# Patient Record
Sex: MALE | Race: WHITE | NOT HISPANIC OR LATINO | URBAN - METROPOLITAN AREA
[De-identification: names, ages, dates, MRNs, and addresses within clinical notes are randomized per-mention and may not be internally consistent; named-entity substitution may affect disease eponyms.]

---

## 2017-01-03 ENCOUNTER — COMMUNICATION - HEALTHEAST (OUTPATIENT)
Dept: INTERNAL MEDICINE | Facility: CLINIC | Age: 72
End: 2017-01-03

## 2017-01-03 ENCOUNTER — OFFICE VISIT - HEALTHEAST (OUTPATIENT)
Dept: PHYSICAL THERAPY | Facility: REHABILITATION | Age: 72
End: 2017-01-03

## 2017-01-03 DIAGNOSIS — Z86.718 HISTORY OF DVT OF LOWER EXTREMITY: ICD-10-CM

## 2017-01-03 DIAGNOSIS — I87.303 VENOUS HYPERTENSION OF BOTH LOWER EXTREMITIES: ICD-10-CM

## 2017-01-03 DIAGNOSIS — M79.89 SWELLING OF LIMB: ICD-10-CM

## 2017-01-03 DIAGNOSIS — I87.009 POST-THROMBOTIC SYNDROME: ICD-10-CM

## 2017-01-03 DIAGNOSIS — M79.605 LEG PAIN, BILATERAL: ICD-10-CM

## 2017-01-03 DIAGNOSIS — N28.9 UNSPECIFIED DISORDER OF KIDNEY AND URETER: ICD-10-CM

## 2017-01-03 DIAGNOSIS — I89.0 ACQUIRED LYMPHEDEMA OF LEG: ICD-10-CM

## 2017-01-03 DIAGNOSIS — L97.922 LEG ULCER, LEFT, WITH FAT LAYER EXPOSED (H): ICD-10-CM

## 2017-01-03 DIAGNOSIS — M79.604 LEG PAIN, BILATERAL: ICD-10-CM

## 2017-01-04 ENCOUNTER — COMMUNICATION - HEALTHEAST (OUTPATIENT)
Dept: INTERNAL MEDICINE | Facility: CLINIC | Age: 72
End: 2017-01-04

## 2017-01-04 ENCOUNTER — COMMUNICATION - HEALTHEAST (OUTPATIENT)
Dept: VASCULAR SURGERY | Facility: CLINIC | Age: 72
End: 2017-01-04

## 2017-01-04 DIAGNOSIS — R52 PAIN: ICD-10-CM

## 2017-01-11 ENCOUNTER — OFFICE VISIT - HEALTHEAST (OUTPATIENT)
Dept: PHYSICAL THERAPY | Facility: REHABILITATION | Age: 72
End: 2017-01-11

## 2017-01-11 DIAGNOSIS — L97.922 LEG ULCER, LEFT, WITH FAT LAYER EXPOSED (H): ICD-10-CM

## 2017-01-11 DIAGNOSIS — M79.604 LEG PAIN, BILATERAL: ICD-10-CM

## 2017-01-11 DIAGNOSIS — M79.605 LEG PAIN, BILATERAL: ICD-10-CM

## 2017-01-11 DIAGNOSIS — Z86.718 HISTORY OF DVT OF LOWER EXTREMITY: ICD-10-CM

## 2017-01-11 DIAGNOSIS — M79.89 SWELLING OF LIMB: ICD-10-CM

## 2017-01-11 DIAGNOSIS — I87.303 VENOUS HYPERTENSION OF BOTH LOWER EXTREMITIES: ICD-10-CM

## 2017-01-11 DIAGNOSIS — I89.0 ACQUIRED LYMPHEDEMA OF LEG: ICD-10-CM

## 2017-01-11 DIAGNOSIS — I87.009 POST-THROMBOTIC SYNDROME: ICD-10-CM

## 2017-01-13 ENCOUNTER — OFFICE VISIT - HEALTHEAST (OUTPATIENT)
Dept: PHYSICAL THERAPY | Facility: REHABILITATION | Age: 72
End: 2017-01-13

## 2017-01-13 DIAGNOSIS — I89.0 ACQUIRED LYMPHEDEMA OF LEG: ICD-10-CM

## 2017-01-13 DIAGNOSIS — M79.604 LEG PAIN, BILATERAL: ICD-10-CM

## 2017-01-13 DIAGNOSIS — I87.009 POST-THROMBOTIC SYNDROME: ICD-10-CM

## 2017-01-13 DIAGNOSIS — M79.89 SWELLING OF LIMB: ICD-10-CM

## 2017-01-13 DIAGNOSIS — M79.605 LEG PAIN, BILATERAL: ICD-10-CM

## 2017-01-13 DIAGNOSIS — L97.922 LEG ULCER, LEFT, WITH FAT LAYER EXPOSED (H): ICD-10-CM

## 2017-01-16 ENCOUNTER — OFFICE VISIT - HEALTHEAST (OUTPATIENT)
Dept: PHYSICAL THERAPY | Facility: REHABILITATION | Age: 72
End: 2017-01-16

## 2017-01-16 ENCOUNTER — HOSPITAL ENCOUNTER (OUTPATIENT)
Dept: RADIOLOGY | Facility: CLINIC | Age: 72
Discharge: HOME | End: 2017-01-16
Attending: PHYSICAL MEDICINE & REHABILITATION

## 2017-01-16 DIAGNOSIS — M79.605 LEG PAIN, BILATERAL: ICD-10-CM

## 2017-01-16 DIAGNOSIS — N18.4 CHRONIC KIDNEY DISEASE (CKD) STAGE G4/A1, SEVERELY DECREASED GLOMERULAR FILTRATION RATE (GFR) BETWEEN 15-29 ML/MIN/1.73 SQUARE METER AND ALBUMINURIA CREATININE RATIO LESS THAN 30 MG/G (H): ICD-10-CM

## 2017-01-16 DIAGNOSIS — M79.89 SWELLING OF LIMB: ICD-10-CM

## 2017-01-16 DIAGNOSIS — I87.303 VENOUS HYPERTENSION OF BOTH LOWER EXTREMITIES: ICD-10-CM

## 2017-01-16 DIAGNOSIS — I87.009 POST-THROMBOTIC SYNDROME: ICD-10-CM

## 2017-01-16 DIAGNOSIS — L97.922 LEG ULCER, LEFT, WITH FAT LAYER EXPOSED (H): ICD-10-CM

## 2017-01-16 DIAGNOSIS — M79.604 LEG PAIN, BILATERAL: ICD-10-CM

## 2017-01-16 DIAGNOSIS — M17.0 PRIMARY OSTEOARTHRITIS OF BOTH KNEES: ICD-10-CM

## 2017-01-16 DIAGNOSIS — I89.0 ACQUIRED LYMPHEDEMA OF LEG: ICD-10-CM

## 2017-01-16 DIAGNOSIS — Z72.0 TOBACCO ABUSE: ICD-10-CM

## 2017-01-16 DIAGNOSIS — M10.9 GOUT, UNSPECIFIED: ICD-10-CM

## 2017-01-16 DIAGNOSIS — Z86.718 HISTORY OF DVT OF LOWER EXTREMITY: ICD-10-CM

## 2017-01-17 ENCOUNTER — COMMUNICATION - HEALTHEAST (OUTPATIENT)
Dept: VASCULAR SURGERY | Facility: CLINIC | Age: 72
End: 2017-01-17

## 2017-01-18 ENCOUNTER — OFFICE VISIT - HEALTHEAST (OUTPATIENT)
Dept: VASCULAR SURGERY | Facility: CLINIC | Age: 72
End: 2017-01-18

## 2017-01-18 DIAGNOSIS — M79.605 LEG PAIN, BILATERAL: ICD-10-CM

## 2017-01-18 DIAGNOSIS — I87.303 VENOUS HYPERTENSION OF BOTH LOWER EXTREMITIES: ICD-10-CM

## 2017-01-18 DIAGNOSIS — Z86.718 HISTORY OF DVT OF LOWER EXTREMITY: ICD-10-CM

## 2017-01-18 DIAGNOSIS — L97.922 LEG ULCER, LEFT, WITH FAT LAYER EXPOSED (H): ICD-10-CM

## 2017-01-18 DIAGNOSIS — I89.0 ACQUIRED LYMPHEDEMA OF LEG: ICD-10-CM

## 2017-01-18 DIAGNOSIS — I87.009 POST-THROMBOTIC SYNDROME: ICD-10-CM

## 2017-01-18 DIAGNOSIS — M79.604 LEG PAIN, BILATERAL: ICD-10-CM

## 2017-01-18 DIAGNOSIS — L60.3 NAIL DYSTROPHY: ICD-10-CM

## 2017-01-18 DIAGNOSIS — M10.9 GOUT, UNSPECIFIED: ICD-10-CM

## 2017-01-18 DIAGNOSIS — Z72.0 TOBACCO ABUSE: ICD-10-CM

## 2017-01-18 DIAGNOSIS — N18.4 CHRONIC KIDNEY DISEASE (CKD) STAGE G4/A1, SEVERELY DECREASED GLOMERULAR FILTRATION RATE (GFR) BETWEEN 15-29 ML/MIN/1.73 SQUARE METER AND ALBUMINURIA CREATININE RATIO LESS THAN 30 MG/G (H): ICD-10-CM

## 2017-01-18 ASSESSMENT — MIFFLIN-ST. JEOR: SCORE: 1894.31

## 2017-01-19 ENCOUNTER — OFFICE VISIT - HEALTHEAST (OUTPATIENT)
Dept: PHYSICAL THERAPY | Facility: REHABILITATION | Age: 72
End: 2017-01-19

## 2017-01-19 DIAGNOSIS — I87.009 POST-THROMBOTIC SYNDROME: ICD-10-CM

## 2017-01-19 DIAGNOSIS — M79.89 SWELLING OF LIMB: ICD-10-CM

## 2017-01-19 DIAGNOSIS — M79.604 LEG PAIN, BILATERAL: ICD-10-CM

## 2017-01-19 DIAGNOSIS — Z86.718 HISTORY OF DVT OF LOWER EXTREMITY: ICD-10-CM

## 2017-01-19 DIAGNOSIS — I89.0 ACQUIRED LYMPHEDEMA OF LEG: ICD-10-CM

## 2017-01-19 DIAGNOSIS — M79.605 LEG PAIN, BILATERAL: ICD-10-CM

## 2017-01-19 DIAGNOSIS — L97.922 LEG ULCER, LEFT, WITH FAT LAYER EXPOSED (H): ICD-10-CM

## 2017-01-19 DIAGNOSIS — I87.303 VENOUS HYPERTENSION OF BOTH LOWER EXTREMITIES: ICD-10-CM

## 2017-01-23 ENCOUNTER — OFFICE VISIT - HEALTHEAST (OUTPATIENT)
Dept: PHYSICAL THERAPY | Facility: REHABILITATION | Age: 72
End: 2017-01-23

## 2017-01-23 ENCOUNTER — COMMUNICATION - HEALTHEAST (OUTPATIENT)
Dept: SURGERY | Facility: CLINIC | Age: 72
End: 2017-01-23

## 2017-01-23 DIAGNOSIS — M79.89 SWELLING OF LIMB: ICD-10-CM

## 2017-01-23 DIAGNOSIS — I87.009 POST-THROMBOTIC SYNDROME: ICD-10-CM

## 2017-01-23 DIAGNOSIS — Z86.718 HISTORY OF DVT OF LOWER EXTREMITY: ICD-10-CM

## 2017-01-23 DIAGNOSIS — M79.604 LEG PAIN, BILATERAL: ICD-10-CM

## 2017-01-23 DIAGNOSIS — L97.922 LEG ULCER, LEFT, WITH FAT LAYER EXPOSED (H): ICD-10-CM

## 2017-01-23 DIAGNOSIS — M79.605 LEG PAIN, BILATERAL: ICD-10-CM

## 2017-01-23 DIAGNOSIS — I89.0 ACQUIRED LYMPHEDEMA OF LEG: ICD-10-CM

## 2017-01-23 DIAGNOSIS — I87.303 VENOUS HYPERTENSION OF BOTH LOWER EXTREMITIES: ICD-10-CM

## 2017-01-25 ENCOUNTER — OFFICE VISIT - HEALTHEAST (OUTPATIENT)
Dept: PHYSICAL THERAPY | Facility: REHABILITATION | Age: 72
End: 2017-01-25

## 2017-01-25 ENCOUNTER — COMMUNICATION - HEALTHEAST (OUTPATIENT)
Dept: VASCULAR SURGERY | Facility: CLINIC | Age: 72
End: 2017-01-25

## 2017-01-25 DIAGNOSIS — M79.89 SWELLING OF LIMB: ICD-10-CM

## 2017-01-25 DIAGNOSIS — I87.009 POST-THROMBOTIC SYNDROME: ICD-10-CM

## 2017-01-25 DIAGNOSIS — I87.303 VENOUS HYPERTENSION OF BOTH LOWER EXTREMITIES: ICD-10-CM

## 2017-01-25 DIAGNOSIS — Z86.718 HISTORY OF DVT OF LOWER EXTREMITY: ICD-10-CM

## 2017-01-25 DIAGNOSIS — L97.922 LEG ULCER, LEFT, WITH FAT LAYER EXPOSED (H): ICD-10-CM

## 2017-01-25 DIAGNOSIS — I89.0 ACQUIRED LYMPHEDEMA OF LEG: ICD-10-CM

## 2017-01-25 DIAGNOSIS — M79.604 LEG PAIN, BILATERAL: ICD-10-CM

## 2017-01-25 DIAGNOSIS — M79.605 LEG PAIN, BILATERAL: ICD-10-CM

## 2017-01-30 ENCOUNTER — OFFICE VISIT - HEALTHEAST (OUTPATIENT)
Dept: PHYSICAL THERAPY | Facility: REHABILITATION | Age: 72
End: 2017-01-30

## 2017-01-30 ENCOUNTER — COMMUNICATION - HEALTHEAST (OUTPATIENT)
Dept: INTERNAL MEDICINE | Facility: CLINIC | Age: 72
End: 2017-01-30

## 2017-01-30 DIAGNOSIS — I87.009 POST-THROMBOTIC SYNDROME: ICD-10-CM

## 2017-01-30 DIAGNOSIS — M79.89 SWELLING OF LIMB: ICD-10-CM

## 2017-01-30 DIAGNOSIS — L97.922 LEG ULCER, LEFT, WITH FAT LAYER EXPOSED (H): ICD-10-CM

## 2017-01-30 DIAGNOSIS — I89.0 ACQUIRED LYMPHEDEMA OF LEG: ICD-10-CM

## 2017-01-30 DIAGNOSIS — I87.303 VENOUS HYPERTENSION OF BOTH LOWER EXTREMITIES: ICD-10-CM

## 2017-01-30 DIAGNOSIS — Z86.718 HISTORY OF DVT OF LOWER EXTREMITY: ICD-10-CM

## 2017-01-30 DIAGNOSIS — M79.604 LEG PAIN, BILATERAL: ICD-10-CM

## 2017-01-30 DIAGNOSIS — M79.605 LEG PAIN, BILATERAL: ICD-10-CM

## 2017-02-15 ENCOUNTER — COMMUNICATION - HEALTHEAST (OUTPATIENT)
Dept: INTERNAL MEDICINE | Facility: CLINIC | Age: 72
End: 2017-02-15

## 2017-02-15 ENCOUNTER — OFFICE VISIT - HEALTHEAST (OUTPATIENT)
Dept: VASCULAR SURGERY | Facility: CLINIC | Age: 72
End: 2017-02-15

## 2017-02-15 DIAGNOSIS — I89.0 ACQUIRED LYMPHEDEMA OF LEG: ICD-10-CM

## 2017-02-15 DIAGNOSIS — E55.9 VITAMIN D DEFICIENCY: ICD-10-CM

## 2017-02-15 DIAGNOSIS — I87.303 VENOUS HYPERTENSION OF BOTH LOWER EXTREMITIES: ICD-10-CM

## 2017-02-15 DIAGNOSIS — I87.009 POST-THROMBOTIC SYNDROME: ICD-10-CM

## 2017-02-15 DIAGNOSIS — M79.89 SWELLING OF LIMB: ICD-10-CM

## 2017-02-15 DIAGNOSIS — L97.922 LEG ULCER, LEFT, WITH FAT LAYER EXPOSED (H): ICD-10-CM

## 2017-02-15 DIAGNOSIS — R52 PAIN: ICD-10-CM

## 2017-02-15 DIAGNOSIS — L29.9 PRURITUS: ICD-10-CM

## 2017-02-16 ENCOUNTER — COMMUNICATION - HEALTHEAST (OUTPATIENT)
Dept: INTERNAL MEDICINE | Facility: CLINIC | Age: 72
End: 2017-02-16

## 2017-02-16 DIAGNOSIS — R52 PAIN: ICD-10-CM

## 2017-03-15 ENCOUNTER — COMMUNICATION - HEALTHEAST (OUTPATIENT)
Dept: INTERNAL MEDICINE | Facility: CLINIC | Age: 72
End: 2017-03-15

## 2017-03-15 DIAGNOSIS — R52 PAIN: ICD-10-CM

## 2017-03-16 ENCOUNTER — AMBULATORY - HEALTHEAST (OUTPATIENT)
Dept: INTERNAL MEDICINE | Facility: CLINIC | Age: 72
End: 2017-03-16

## 2017-03-16 ENCOUNTER — COMMUNICATION - HEALTHEAST (OUTPATIENT)
Dept: INTERNAL MEDICINE | Facility: CLINIC | Age: 72
End: 2017-03-16

## 2017-03-16 DIAGNOSIS — Z86.718 HISTORY OF DVT OF LOWER EXTREMITY: ICD-10-CM

## 2017-03-16 DIAGNOSIS — I82.409 DVT (DEEP VENOUS THROMBOSIS) (H): ICD-10-CM

## 2017-03-20 ENCOUNTER — COMMUNICATION - HEALTHEAST (OUTPATIENT)
Dept: INTERNAL MEDICINE | Facility: CLINIC | Age: 72
End: 2017-03-20

## 2017-03-20 DIAGNOSIS — G25.81 RLS (RESTLESS LEGS SYNDROME): ICD-10-CM

## 2017-03-24 ENCOUNTER — COMMUNICATION - HEALTHEAST (OUTPATIENT)
Dept: INTERNAL MEDICINE | Facility: CLINIC | Age: 72
End: 2017-03-24

## 2017-03-24 ENCOUNTER — AMBULATORY - HEALTHEAST (OUTPATIENT)
Dept: LAB | Facility: CLINIC | Age: 72
End: 2017-03-24

## 2017-03-24 DIAGNOSIS — I82.409 DVT (DEEP VENOUS THROMBOSIS) (H): ICD-10-CM

## 2017-03-24 DIAGNOSIS — Z86.718 HISTORY OF DVT OF LOWER EXTREMITY: ICD-10-CM

## 2017-04-06 ENCOUNTER — COMMUNICATION - HEALTHEAST (OUTPATIENT)
Dept: INTERNAL MEDICINE | Facility: CLINIC | Age: 72
End: 2017-04-06

## 2017-04-06 DIAGNOSIS — R52 PAIN: ICD-10-CM

## 2017-04-17 ENCOUNTER — COMMUNICATION - HEALTHEAST (OUTPATIENT)
Dept: SURGERY | Facility: CLINIC | Age: 72
End: 2017-04-17

## 2017-04-17 ENCOUNTER — OFFICE VISIT - HEALTHEAST (OUTPATIENT)
Dept: VASCULAR SURGERY | Facility: CLINIC | Age: 72
End: 2017-04-17

## 2017-04-17 DIAGNOSIS — L03.116 CELLULITIS OF LEFT LEG WITHOUT FOOT: ICD-10-CM

## 2017-04-17 DIAGNOSIS — I89.0 ACQUIRED LYMPHEDEMA OF LEG: ICD-10-CM

## 2017-04-17 DIAGNOSIS — I87.009 POST-THROMBOTIC SYNDROME: ICD-10-CM

## 2017-04-17 DIAGNOSIS — N18.4 CHRONIC KIDNEY DISEASE (CKD) STAGE G4/A1, SEVERELY DECREASED GLOMERULAR FILTRATION RATE (GFR) BETWEEN 15-29 ML/MIN/1.73 SQUARE METER AND ALBUMINURIA CREATININE RATIO LESS THAN 30 MG/G (H): ICD-10-CM

## 2017-04-17 DIAGNOSIS — L97.922 LEG ULCER, LEFT, WITH FAT LAYER EXPOSED (H): ICD-10-CM

## 2017-04-17 DIAGNOSIS — I87.303 VENOUS HYPERTENSION OF BOTH LOWER EXTREMITIES: ICD-10-CM

## 2017-04-17 DIAGNOSIS — M79.89 SWELLING OF LIMB: ICD-10-CM

## 2017-04-19 ENCOUNTER — AMBULATORY - HEALTHEAST (OUTPATIENT)
Dept: VASCULAR SURGERY | Facility: CLINIC | Age: 72
End: 2017-04-19

## 2017-04-19 ENCOUNTER — COMMUNICATION - HEALTHEAST (OUTPATIENT)
Dept: VASCULAR SURGERY | Facility: CLINIC | Age: 72
End: 2017-04-19

## 2017-04-19 ENCOUNTER — COMMUNICATION - HEALTHEAST (OUTPATIENT)
Dept: INTERNAL MEDICINE | Facility: CLINIC | Age: 72
End: 2017-04-19

## 2017-04-19 DIAGNOSIS — Z86.718 HISTORY OF DVT OF LOWER EXTREMITY: ICD-10-CM

## 2017-04-19 DIAGNOSIS — L03.116 CELLULITIS OF LEFT LEG WITHOUT FOOT: ICD-10-CM

## 2017-05-01 ENCOUNTER — COMMUNICATION - HEALTHEAST (OUTPATIENT)
Dept: INTERNAL MEDICINE | Facility: CLINIC | Age: 72
End: 2017-05-01

## 2017-05-01 ENCOUNTER — OFFICE VISIT - HEALTHEAST (OUTPATIENT)
Dept: INTERNAL MEDICINE | Facility: CLINIC | Age: 72
End: 2017-05-01

## 2017-05-01 DIAGNOSIS — I89.0 ACQUIRED LYMPHEDEMA OF LEG: ICD-10-CM

## 2017-05-01 DIAGNOSIS — R52 PAIN: ICD-10-CM

## 2017-05-01 DIAGNOSIS — L97.922 LEG ULCER, LEFT, WITH FAT LAYER EXPOSED (H): ICD-10-CM

## 2017-05-01 DIAGNOSIS — N18.4 CHRONIC KIDNEY DISEASE (CKD) STAGE G4/A1, SEVERELY DECREASED GLOMERULAR FILTRATION RATE (GFR) BETWEEN 15-29 ML/MIN/1.73 SQUARE METER AND ALBUMINURIA CREATININE RATIO LESS THAN 30 MG/G (H): ICD-10-CM

## 2017-05-02 ENCOUNTER — COMMUNICATION - HEALTHEAST (OUTPATIENT)
Dept: INTERNAL MEDICINE | Facility: CLINIC | Age: 72
End: 2017-05-02

## 2017-05-03 ENCOUNTER — COMMUNICATION - HEALTHEAST (OUTPATIENT)
Dept: INTERNAL MEDICINE | Facility: CLINIC | Age: 72
End: 2017-05-03

## 2017-05-03 DIAGNOSIS — N28.9 UNSPECIFIED DISORDER OF KIDNEY AND URETER: ICD-10-CM

## 2017-05-04 ENCOUNTER — COMMUNICATION - HEALTHEAST (OUTPATIENT)
Dept: INTERNAL MEDICINE | Facility: CLINIC | Age: 72
End: 2017-05-04

## 2017-05-04 DIAGNOSIS — R52 PAIN: ICD-10-CM

## 2017-05-09 ENCOUNTER — OFFICE VISIT - HEALTHEAST (OUTPATIENT)
Dept: INTERNAL MEDICINE | Facility: CLINIC | Age: 72
End: 2017-05-09

## 2017-05-09 DIAGNOSIS — I89.0 ACQUIRED LYMPHEDEMA OF LEG: ICD-10-CM

## 2017-05-09 DIAGNOSIS — I10 ESSENTIAL HYPERTENSION: ICD-10-CM

## 2017-05-09 DIAGNOSIS — Z12.11 SCREENING FOR COLON CANCER: ICD-10-CM

## 2017-05-09 DIAGNOSIS — R52 PAIN: ICD-10-CM

## 2017-05-15 ENCOUNTER — COMMUNICATION - HEALTHEAST (OUTPATIENT)
Dept: INTERNAL MEDICINE | Facility: CLINIC | Age: 72
End: 2017-05-15

## 2017-05-15 DIAGNOSIS — Z86.718 HISTORY OF DVT OF LOWER EXTREMITY: ICD-10-CM

## 2017-05-29 ENCOUNTER — COMMUNICATION - HEALTHEAST (OUTPATIENT)
Dept: INTERNAL MEDICINE | Facility: CLINIC | Age: 72
End: 2017-05-29

## 2017-05-29 DIAGNOSIS — G25.81 RLS (RESTLESS LEGS SYNDROME): ICD-10-CM

## 2017-06-05 ENCOUNTER — OFFICE VISIT - HEALTHEAST (OUTPATIENT)
Dept: VASCULAR SURGERY | Facility: CLINIC | Age: 72
End: 2017-06-05

## 2017-06-05 ENCOUNTER — COMMUNICATION - HEALTHEAST (OUTPATIENT)
Dept: INTERNAL MEDICINE | Facility: CLINIC | Age: 72
End: 2017-06-05

## 2017-06-05 DIAGNOSIS — I87.009 POST-THROMBOTIC SYNDROME: ICD-10-CM

## 2017-06-05 DIAGNOSIS — I87.303 VENOUS HYPERTENSION OF BOTH LOWER EXTREMITIES: ICD-10-CM

## 2017-06-05 DIAGNOSIS — Z72.0 TOBACCO ABUSE: ICD-10-CM

## 2017-06-05 DIAGNOSIS — Z86.718 HISTORY OF DVT OF LOWER EXTREMITY: ICD-10-CM

## 2017-06-05 DIAGNOSIS — R52 PAIN: ICD-10-CM

## 2017-06-05 DIAGNOSIS — I89.0 ACQUIRED LYMPHEDEMA OF LEG: ICD-10-CM

## 2017-06-06 ENCOUNTER — COMMUNICATION - HEALTHEAST (OUTPATIENT)
Dept: INTERNAL MEDICINE | Facility: CLINIC | Age: 72
End: 2017-06-06

## 2017-06-13 ENCOUNTER — COMMUNICATION - HEALTHEAST (OUTPATIENT)
Dept: INTERNAL MEDICINE | Facility: CLINIC | Age: 72
End: 2017-06-13

## 2017-06-13 DIAGNOSIS — Z86.718 HISTORY OF DVT OF LOWER EXTREMITY: ICD-10-CM

## 2017-06-14 ENCOUNTER — COMMUNICATION - HEALTHEAST (OUTPATIENT)
Dept: INTERNAL MEDICINE | Facility: CLINIC | Age: 72
End: 2017-06-14

## 2017-06-14 DIAGNOSIS — Z86.718 HISTORY OF DVT OF LOWER EXTREMITY: ICD-10-CM

## 2017-06-22 ENCOUNTER — COMMUNICATION - HEALTHEAST (OUTPATIENT)
Dept: INTERNAL MEDICINE | Facility: CLINIC | Age: 72
End: 2017-06-22

## 2017-06-22 DIAGNOSIS — I10 ESSENTIAL HYPERTENSION: ICD-10-CM

## 2017-06-25 ENCOUNTER — COMMUNICATION - HEALTHEAST (OUTPATIENT)
Dept: INTERNAL MEDICINE | Facility: CLINIC | Age: 72
End: 2017-06-25

## 2017-06-25 DIAGNOSIS — I10 ESSENTIAL HYPERTENSION: ICD-10-CM

## 2017-06-26 ENCOUNTER — COMMUNICATION - HEALTHEAST (OUTPATIENT)
Dept: INTERNAL MEDICINE | Facility: CLINIC | Age: 72
End: 2017-06-26

## 2017-06-26 DIAGNOSIS — R52 PAIN: ICD-10-CM

## 2017-07-26 ENCOUNTER — COMMUNICATION - HEALTHEAST (OUTPATIENT)
Dept: INTERNAL MEDICINE | Facility: CLINIC | Age: 72
End: 2017-07-26

## 2017-07-26 DIAGNOSIS — R52 PAIN: ICD-10-CM

## 2017-08-09 ENCOUNTER — COMMUNICATION - HEALTHEAST (OUTPATIENT)
Dept: INTERNAL MEDICINE | Facility: CLINIC | Age: 72
End: 2017-08-09

## 2017-08-18 ENCOUNTER — COMMUNICATION - HEALTHEAST (OUTPATIENT)
Dept: INTERNAL MEDICINE | Facility: CLINIC | Age: 72
End: 2017-08-18

## 2017-08-18 DIAGNOSIS — I10 ESSENTIAL HYPERTENSION: ICD-10-CM

## 2017-08-28 ENCOUNTER — COMMUNICATION - HEALTHEAST (OUTPATIENT)
Dept: INTERNAL MEDICINE | Facility: CLINIC | Age: 72
End: 2017-08-28

## 2017-08-28 DIAGNOSIS — G25.81 RLS (RESTLESS LEGS SYNDROME): ICD-10-CM

## 2017-08-28 DIAGNOSIS — N28.9 UNSPECIFIED DISORDER OF KIDNEY AND URETER: ICD-10-CM

## 2017-08-28 DIAGNOSIS — Z86.718 HISTORY OF DVT OF LOWER EXTREMITY: ICD-10-CM

## 2017-08-28 DIAGNOSIS — G89.29 CHRONIC PAIN: ICD-10-CM

## 2017-08-28 DIAGNOSIS — I10 ESSENTIAL HYPERTENSION: ICD-10-CM

## 2017-08-29 RX ORDER — OXYCODONE AND ACETAMINOPHEN 5; 325 MG/1; MG/1
2 TABLET ORAL EVERY 6 HOURS PRN
Qty: 240 TABLET | Refills: 0 | Status: SHIPPED | OUTPATIENT
Start: 2017-08-29

## 2017-08-29 RX ORDER — GABAPENTIN 300 MG/1
CAPSULE ORAL
Qty: 270 CAPSULE | Refills: 0 | Status: SHIPPED | OUTPATIENT
Start: 2017-08-29

## 2017-08-29 RX ORDER — WARFARIN SODIUM 7.5 MG/1
TABLET ORAL
Qty: 30 TABLET | Refills: 0 | Status: SHIPPED | OUTPATIENT
Start: 2017-08-29

## 2017-08-29 RX ORDER — ATENOLOL 50 MG/1
TABLET ORAL
Qty: 135 TABLET | Refills: 0 | Status: SHIPPED | OUTPATIENT
Start: 2017-08-29

## 2017-08-29 RX ORDER — AMLODIPINE BESYLATE 10 MG/1
10 TABLET ORAL DAILY
Qty: 90 TABLET | Refills: 0 | Status: SHIPPED | OUTPATIENT
Start: 2017-08-29

## 2017-08-30 ENCOUNTER — COMMUNICATION - HEALTHEAST (OUTPATIENT)
Dept: INTERNAL MEDICINE | Facility: CLINIC | Age: 72
End: 2017-08-30

## 2017-09-12 ENCOUNTER — COMMUNICATION - HEALTHEAST (OUTPATIENT)
Dept: INTERNAL MEDICINE | Facility: CLINIC | Age: 72
End: 2017-09-12

## 2017-09-12 DIAGNOSIS — I10 ESSENTIAL HYPERTENSION: ICD-10-CM

## 2017-09-12 RX ORDER — METOPROLOL SUCCINATE 100 MG/1
100 TABLET, EXTENDED RELEASE ORAL DAILY
Qty: 90 TABLET | Refills: 1 | Status: SHIPPED | OUTPATIENT
Start: 2017-09-12

## 2017-11-13 ENCOUNTER — COMMUNICATION - HEALTHEAST (OUTPATIENT)
Dept: INTERNAL MEDICINE | Facility: CLINIC | Age: 72
End: 2017-11-13

## 2017-11-13 DIAGNOSIS — G25.81 RLS (RESTLESS LEGS SYNDROME): ICD-10-CM

## 2018-03-31 ENCOUNTER — COMMUNICATION - HEALTHEAST (OUTPATIENT)
Dept: INTERNAL MEDICINE | Facility: CLINIC | Age: 73
End: 2018-03-31

## 2018-03-31 DIAGNOSIS — N28.9 DISORDER OF KIDNEY AND URETER: ICD-10-CM

## 2018-03-31 RX ORDER — FUROSEMIDE 20 MG
TABLET ORAL
Qty: 90 TABLET | Refills: 0 | Status: SHIPPED | OUTPATIENT
Start: 2018-03-31

## 2018-07-29 ENCOUNTER — COMMUNICATION - HEALTHEAST (OUTPATIENT)
Dept: INTERNAL MEDICINE | Facility: CLINIC | Age: 73
End: 2018-07-29

## 2018-07-29 DIAGNOSIS — G25.81 RLS (RESTLESS LEGS SYNDROME): ICD-10-CM

## 2018-07-30 RX ORDER — GABAPENTIN 300 MG/1
CAPSULE ORAL
Qty: 90 CAPSULE | Refills: 0 | Status: SHIPPED | OUTPATIENT
Start: 2018-07-30

## 2018-09-15 ENCOUNTER — COMMUNICATION - HEALTHEAST (OUTPATIENT)
Dept: INTERNAL MEDICINE | Facility: CLINIC | Age: 73
End: 2018-09-15

## 2018-09-15 DIAGNOSIS — I10 ESSENTIAL HYPERTENSION: ICD-10-CM

## 2018-09-19 RX ORDER — ATENOLOL 50 MG/1
TABLET ORAL
Qty: 135 TABLET | Refills: 0 | Status: SHIPPED | OUTPATIENT
Start: 2018-09-19

## 2019-07-03 ENCOUNTER — COMMUNICATION - HEALTHEAST (OUTPATIENT)
Dept: INTERNAL MEDICINE | Facility: CLINIC | Age: 74
End: 2019-07-03

## 2019-07-03 DIAGNOSIS — I10 ESSENTIAL HYPERTENSION: ICD-10-CM

## 2021-05-25 ENCOUNTER — RECORDS - HEALTHEAST (OUTPATIENT)
Dept: ADMINISTRATIVE | Facility: CLINIC | Age: 76
End: 2021-05-25

## 2021-05-28 ENCOUNTER — RECORDS - HEALTHEAST (OUTPATIENT)
Dept: ADMINISTRATIVE | Facility: CLINIC | Age: 76
End: 2021-05-28

## 2021-05-29 ENCOUNTER — RECORDS - HEALTHEAST (OUTPATIENT)
Dept: ADMINISTRATIVE | Facility: CLINIC | Age: 76
End: 2021-05-29

## 2021-05-30 VITALS — WEIGHT: 245 LBS | HEIGHT: 72 IN | BODY MASS INDEX: 33.18 KG/M2

## 2021-05-30 NOTE — TELEPHONE ENCOUNTER
Patient has establish care someone else. Instructed patient to call pharmacy and let them know they are sending prescription to wrong clinic.

## 2021-05-30 NOTE — TELEPHONE ENCOUNTER
RN cannot approve Refill Request    Former patient of Dr Janessa Barraza & has not established care with another provider.  Please assign refill request to covering provider per Clinic standard process.     Zara Fernando, [facility] Triage/Med Refill 7/4/2019    Requested Prescriptions   Pending Prescriptions Disp Refills     atenolol (TENORMIN) 50 MG tablet [Pharmacy Med Name: ATENOLOL 50MG TABLETS] 135 tablet 0     Sig: TAKE 1&1/2 TABLETS BY MOUTH DAILY       Beta-Blockers Refill Protocol Failed - 7/3/2019  2:01 PM        Failed - PCP or prescribing provider visit in past 12 months or next 3 months     Last office visit with prescriber/PCP: 5/9/2017 Janessa Barraza MD OR same dept: Visit date not found OR same specialty: 5/9/2017 Janessa Barraza MD  Last physical: Visit date not found Last MTM visit: Visit date not found   Next visit within 3 mo: Visit date not found  Next physical within 3 mo: Visit date not found  Prescriber OR PCP: Janessa Barraza MD  Last diagnosis associated with med order: 1. Essential hypertension  - atenolol (TENORMIN) 50 MG tablet [Pharmacy Med Name: ATENOLOL 50MG TABLETS]; TAKE 1&1/2 TABLETS BY MOUTH DAILY  Dispense: 135 tablet; Refill: 0    If protocol passes may refill for 12 months if within 3 months of last provider visit (or a total of 15 months).             Failed - Blood pressure filed in past 12 months     BP Readings from Last 1 Encounters:   06/05/17 104/44

## 2021-06-05 ENCOUNTER — RECORDS - HEALTHEAST (OUTPATIENT)
Dept: SCHEDULING | Facility: CLINIC | Age: 76
End: 2021-06-05

## 2021-06-05 ENCOUNTER — RECORDS - HEALTHEAST (OUTPATIENT)
Dept: CARDIOLOGY | Facility: CLINIC | Age: 76
End: 2021-06-05

## 2021-06-05 DIAGNOSIS — N18.9 ANEMIA IN CHRONIC KIDNEY DISEASE: ICD-10-CM

## 2021-06-05 DIAGNOSIS — D63.1 ANEMIA IN CHRONIC KIDNEY DISEASE: ICD-10-CM

## 2021-06-05 DIAGNOSIS — R94.31 ABNORMAL ELECTROCARDIOGRAM: ICD-10-CM

## 2021-06-05 DIAGNOSIS — I10 ESSENTIAL HYPERTENSION: ICD-10-CM

## 2021-06-05 DIAGNOSIS — M10.9 GOUT: ICD-10-CM

## 2021-06-05 DIAGNOSIS — N18.4 CHRONIC KIDNEY DISEASE, STAGE IV (SEVERE) (H): ICD-10-CM

## 2021-06-08 NOTE — PROGRESS NOTES
[facility] Daily Progress     Patient Name: Isaias Sales  Date: 1/3/2017  Visit #: 19  PTA# 6  Referral Diagnosis: phlebolymphedema, fibrosis, wound  Referring provider: Sierra Raza, *  Visit Diagnosis:     ICD-10-CM    1. 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 Swelling of limb M79.89    3. Venous hypertension of both lower extremities I87.303    4. Acquired lymphedema of leg I89.0    5. Leg ulcer, left, with fat layer exposed L97.922    6. Leg pain, bilateral M79.604     M79.605    7. History of DVT of lower extremity Z86.718      Assessment:   Spoke with Karen from  vascular center while pt was in the clinic. Karen stated that she had ordered the wound dressing on 12/28 and requested that it be sent over night. Pt has not received this yet.  Pt has been instructed to call the vascular center if the wound dressing is not received today. Pt reports he checks his temperature daily.    Pt has not scheduled garment fit yet.  Dorsum of feet remain swollen.  Wound on L anterior lower leg remains unchanged from last session. Skin around wound is red.       Goal Status:  Pt. will be independent with home exercise program in : 6 weeks: Ongoing  Patient will have a decreased volume in : bilateral;LE;for better fit of clothing;to decrease risk of infection;in 6 weeks: Ongoing  Patient will perform, verbalize self-management of: skin care;self-monitoring;compression wear;compression care;in 6 weeks: Ongoong    Plan / Patient Education:     Continue with initial plan of care.    Wrapping then MLD.     Do wound care next session, patient has new silver dressing, to be changed weekly. (Patient to hear back from [facility] about how to get more product). Did not have it today.    Pt to see Dr. Raza on 1/18/17.  Consider re measuring LE volumes next visit.    Subjective:     Pt has not heard from the  Vascular center regarding the wound care medication. He has been waiting for a call back.   Pt reports  that the wound does not cause him pain.   Pt took the bandages off yesterday morning due to his legs itching.    Objective:   Caregiver present: No    Observation of swelling: Improving, dorsum of feet remain puffy R>L    Volume measurements taken:  No (Last Taken 12-7-16)    Skin condition is:  Intact on The R, open wound on the L, appears to be the same as last visit.     Compression: Tubes on B LE with wound dressing on L lower leg..     Treatment Today   12/9/2016  TREATMENT MINUTES COMMENTS   Evaluation     Self-care/ Home management 40 Washed B LE with warm water, towels.  Wound care with Santyl cream and oil emulsion dressing due to pt not having silver with today,  Medical compression bandages B LE: stockinette, 3 rolls of comprilan with tube on top. Placed gray foam over top of bilat dorsum of feet              Manual therapy 15 MLD/LE pump points B   Neuromuscular Re-education     Therapeutic Activity     Therapeutic Exercises     Gait training     Modality__________________                   Total 55    Blank areas are intentional and mean the treatment did not include these items.       Shannan Luna,CLT  1/3/2017

## 2021-06-08 NOTE — PROGRESS NOTES
[facility] Daily Progress     Patient Name: Isaias Sales  Date: 1/23/2017  Visit #: 24  Referral Diagnosis: lymphedema, fibrosis, wound  Referring provider: Sierra Raza, *  Visit Diagnosis:     ICD-10-CM    1. 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 Swelling of limb M79.89    3. Acquired lymphedema of leg I89.0    4. Leg pain, bilateral M79.604     M79.605    5. Leg ulcer, left, with fat layer exposed L97.922    6. Venous hypertension of both lower extremities I87.303    7. History of DVT of lower extremity Z86.718          Assessment:     Patient has one more visit with PT until he sees [facility] this week. Will schedule more if needed until he gets his garments, because he will get too much refill in LEs if he has to wait >1-2 days for garment.     Did new wound care management today as patient did bring in supplies today.     Goal Status:  Pt. will be independent with home exercise program: PROGRESSING TOWARD  Patient will have a decreased volume in : bilateral;LE;for better fit of clothing;to decrease risk of infection: PROGRESSING TOWARD  Patient will perform, verbalize self-management of: skin care;self-monitoring;compression wear;compression care: PROGRESSING TOWARD    Plan / Patient Education:     COntinue with POC until velcro garments arrive.    Subjective:     No new changes since last visit.   Took compression wraps off this morning to change clothing.     Objective:     Patient to dept with elasticized stockinette only and noting bunching/rolling and binding below knees and at ankles resulting in pooling of edema of dorsal feet and lower leg.  Even though he hasn't had compression wraps off for long this morning, he does have some refill of swelling.    No weeping in LEs.    Treatment Today   1/13/2017  TREATMENT MINUTES COMMENTS   Evaluation     Self-care/ Home management 28 Continued to Educate pt in proper use of elasticized stockinette if used to minimize constriction and  pooling below and above as well as need for continuous compression bandaging to minimize edema refill to promote wound healing and optimize home management.  Performed wound care removing prior bandaging. Used silver mesh piece to fit inside th wound, with sticky pad over top and yo to secure it. Lotioned LEs prior to compression wraps. Continued to instruct in gradient compression bandaging using tricofix stockinette followed by 8cm spiraled 3x around foot then up to ankle then center of heel, then spiraled lower 1/2 of heel bandage and back to ankle.  10cm x 5m figure 8 malleoli to widest calf, educated in likely benefit of adding 10cm x 10m figure 8 malleoli to below knee and ending with 12cm x 5m spiraled malleoli to knee and educated patient to remove and reapply over weekend if noting loosening of bandage.     Manual therapy     Neuromuscular Re-education     Therapeutic Activity     Therapeutic Exercises     Gait training     Modality__________________                Total 28    Blank areas are intentional and mean the treatment did not include these items.       Kimmie Azul, DPT, CLT  1/23/2017

## 2021-06-08 NOTE — PROGRESS NOTES
[facility] Daily Progress     Patient Name: Isaias Sales  Date: 1/25/2017  Visit #: 25  Referral Diagnosis: lymphedema, fibrosis, wound  Referring provider: Sierra Raza, *  Visit Diagnosis:     ICD-10-CM    1. 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 Swelling of limb M79.89    3. Acquired lymphedema of leg I89.0    4. Leg pain, bilateral M79.604     M79.605    5. Leg ulcer, left, with fat layer exposed L97.922    6. Venous hypertension of both lower extremities I87.303    7. History of DVT of lower extremity Z86.718          Assessment:     Patient has one more visit with PT until he sees [facility] tomorrow. Will schedule more if needed until he gets his garments, because he will get too much refill in LEs if he has to wait >1-2 days for garment.     Did new wound care management today as patient did bring in supplies.    Goal Status:  Pt. will be independent with home exercise program: PROGRESSING TOWARD  Patient will have a decreased volume in : bilateral;LE;for better fit of clothing;to decrease risk of infection: PROGRESSING TOWARD  Patient will perform, verbalize self-management of: skin care;self-monitoring;compression wear;compression care: PROGRESSING TOWARD    Plan / Patient Education:     COntinue with POC until velcro garments arrive.    Subjective:     No new changes since last visit.   Took compression wraps off this morning to change clothing. Has been off about an hour now.    Objective:     Patient to dept with elasticized stockinette only and noting bunching/rolling and binding below knees and at ankles resulting in pooling of edema of dorsal feet and lower leg.  Even though he hasn't had compression wraps off for long this morning, he does have some refill of swelling.    Slight weeping of left Lateral lower leg noted today, placed ABD on prior to compression wrapping.     Treatment Today   1/25/2017  TREATMENT MINUTES COMMENTS   Evaluation     Self-care/ Home management 35  Continued to Educate pt in proper use of elasticized stockinette if used to minimize constriction and pooling below and above as well as need for continuous compression bandaging to minimize edema refill to promote wound healing and optimize home management.  Performed wound care removing prior bandaging. Used silver mesh piece to fit inside th wound, with sticky pad over top and yo to secure it. Also placed abd on lateral lower leg. Lotioned LEs prior to compression wraps. Continued to instruct in gradient compression bandaging using tricofix stockinette followed by 8cm spiraled 3x around foot then up to ankle then center of heel, then spiraled lower 1/2 of heel bandage and back to ankle.  10cm x 5m figure 8 malleoli to widest calf, educated in likely benefit of adding 10cm x 10m figure 8 malleoli to below knee and ending with 12cm x 5m spiraled malleoli to knee and educated patient to remove and reapply over weekend if noting loosening of bandage.     Manual therapy 25 MLD to regional nodes and knees after compression wraps.    Neuromuscular Re-education     Therapeutic Activity     Therapeutic Exercises     Gait training     Modality__________________                Total 60    Blank areas are intentional and mean the treatment did not include these items.       Kimmie Azul, DPT, CLT  1/25/2017

## 2021-06-08 NOTE — PROGRESS NOTES
[facility] Daily Progress     Patient Name: Isaias Sales  Date: 1/30/2017  Visit #: 26  Referral Diagnosis: lymphedema, fibrosis, wound  Referring provider: Sierra Raza, *  Visit Diagnosis:     ICD-10-CM    1. 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 Swelling of limb M79.89    3. Acquired lymphedema of leg I89.0    4. Leg pain, bilateral M79.604     M79.605    5. Leg ulcer, left, with fat layer exposed L97.922    6. Venous hypertension of both lower extremities I87.303    7. History of DVT of lower extremity Z86.718          Assessment:     Patient was unable to make his [facility] appointment again last week d/t illness.   He realizes he needs to get proper fitting compression garments and he is at that stage where he should be wearing them. He seems to be maintaining volumes vs. Losing volumes at this point, so long term compression garment is what he needs. His past few PT visits with continuous graded compression wrapping are to maintain him until he receives garments as he tends to refill quickly.     Goal Status:  Pt. will be independent with home exercise program: PROGRESSING TOWARD  Patient will have a decreased volume in : bilateral;LE;for better fit of clothing;to decrease risk of infection: PROGRESSING TOWARD  Patient will perform, verbalize self-management of: skin care;self-monitoring;compression wear;compression care: PROGRESSING TOWARD    Plan / Patient Education:     COntinue with POC until velcro garments arrive. Possible DC at next session. Remeasure volumes    Subjective:     Took compression wraps off 3 days ago d/t discomfort. Started itching left leg and new weeping occurred.    Objective:     Patient to dept with elasticized stockinette only and noting bunching/rolling and binding below knees and at ankles resulting in pooling of edema of dorsal feet and lower leg.      Slight weeping of left Lateral lower leg noted today through small open sores d/t itching legs. Placed  ABD on prior to compression wrapping.     Treatment Today   1/30/2017  TREATMENT MINUTES COMMENTS   Evaluation     Self-care/ Home management 25 Continued to Educate pt in proper use of elasticized stockinette if used to minimize constriction and pooling below and above as well as need for continuous compression bandaging to minimize edema refill to promote wound healing and optimize home management.  Performed light washing with saline left anterior leg/wound with light sloughing of dead skin prior bandaging. Used silver mesh piece to fit inside th wound, with sticky pad over top and yo to secure it. Also placed abd on lateral lower leg. Lotioned LEs prior to compression wraps. Continued to instruct in gradient compression bandaging using tricofix stockinette followed by 8cm spiraled 3x around foot then up to ankle then center of heel, then spiraled lower 1/2 of heel bandage and back to ankle.  10cm x 5m figure 8 malleoli to widest calf, educated in likely benefit of adding 10cm x 10m figure 8 malleoli to below knee and ending with 12cm x 5m spiraled malleoli to knee and educated patient to remove and reapply over weekend if noting loosening of bandage.     Manual therapy 25 MLD to regional nodes and knees after compression wraps.    Neuromuscular Re-education     Therapeutic Activity     Therapeutic Exercises     Gait training     Modality__________________                Total 50    Blank areas are intentional and mean the treatment did not include these items.       Kimmie Azul, DPT, CLT  1/30/2017

## 2021-06-08 NOTE — PROGRESS NOTES
[facility] Daily Progress     Patient Name: Isaias Sales  Date: 1/11/2017  Visit #: 20  PTA# 6  Referral Diagnosis: phlebolymphedema, fibrosis, wound  Referring provider: Yasmeen Buenrostro MD  Visit Diagnosis:     ICD-10-CM    1. 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 Swelling of limb M79.89    3. Venous hypertension of both lower extremities I87.303    4. Acquired lymphedema of leg I89.0    5. Leg ulcer, left, with fat layer exposed L97.922    6. Leg pain, bilateral M79.604     M79.605    7. History of DVT of lower extremity Z86.718      Assessment:   Spoke with Karen from  vascular center while pt was in the clinic. Karen stated that she had ordered the wound dressing on 12/28 and requested that it be sent over night. Pt has not received this yet.  Pt has been instructed to call the vascular center if the wound dressing is not received today. Pt reports he checks his temperature daily.    Pt has not scheduled garment fit yet.  Dorsum of feet remain swollen.  Wound on L anterior lower leg remains unchanged from last session. Skin around wound is red.       Goal Status:  Pt. will be independent with home exercise program in : 6 weeks: Ongoing  Patient will have a decreased volume in : bilateral;LE;for better fit of clothing;to decrease risk of infection;in 6 weeks: Ongoing  Patient will perform, verbalize self-management of: skin care;self-monitoring;compression wear;compression care;in 6 weeks: Ongoong    Plan / Patient Education:     Do wound care with santyl and oil emulsion (doesn't have the new dressing yet as of today).  Continue to encourage self wrapping, though patient states he has tried and it hsan't worked well.   Resume MLD, can do after wrapping is done. Make sure wraps are tight as patient reports they fall down too easily if not tight.    Pt to see Dr. Raza on 1/18/17.  [facility] cervantest on 1/18/17    Subjective:     Pt does not have new medication supplies d/t insurance confusion, so  continuing with santyl and oil emulsion coverage to left anterior leg wound. Patient reports the wraps have not been tight enough and they fall down right after his appointment sometimes. This past time it happened, so he hasn't been wearing his wraps for the past 4 days. He attempted self wrapping, but stated it didn't work well, so he stopped.     Objective:     Date 11/3/2016 11/9/2016 11/21/2016 11/21/2016 12/7/2016 12/7/2016 1/11/2017 1/11/2017   Side right left right left right left right left   Toe 12.3 11 11.7 10.1 11.8 10.7 12.3 11   10 cm from tip of second toe 33 31.1 30.5 30 29.1 31.6 32.1 32.3   20 cm from tip of second toe 35.8 37.5 33.2 33.5 29.5 32.5 35.8 39.9   30 cm from tip of second toe 37.5 40 39.1 40 38.2 37.8 39.6 42.9   40 cm from tip of second toe 42 49 44.9 49.7 45.9 47.1 46.1 54.5    50 cm from tip of second toe 50.5 51.2 46.3 53 50.5 52.5 50 58.3   60 cm from tip of second toe 51.1 49 50 48.5 50 51.4 49 51.5   70 cm from tip of second toe 57.8 60.8 57.2 58.2 57.6 58.2 57.2 62   80 cm from tip of second toe 63.3                                           segment 1 estimated volume 878.4792204 938.142084 561.2006613 296.0502545 683.254633 975.6664728 371.6416664 1040.8919   segment 2 estimated volume 1069.697840 0678.928207 9362.549858 4382.261062 916.465232 619.8481064 1131.303980 5707.5229   segment 3 estimated volume 1258.993223 4453.079227 6677.411286 2526.281578 5948.61821 1439.569782 5513.19162 1896.2542   segment 4 estimated volume 1707.231521 5047.455612 3474.121379 5236.04567 1850.78857 1975.652688 8188.234284 5915.2851   segment 5 estimated volume 2053.004012 3301.083880 1308.425194 1959.054810 4925.30574 2147.912952 9808.92883 2401.5393   segment 6 estimated volume 2362.864040 0557.566070 6245.618366 1998.147568 9519.1525 2392.972758 3090.048112 4988.1534              Total estimated volume 9392.481912 85761.29283 9046.211897 8530.585479 1941.19226 9758.12773 9550.815615 10873.647        Caregiver present: No    Observation of swelling: Improving, dorsum of feet remain puffy R>L    Volume measurements taken:  Yes, see above    Skin condition is:  Slight weeping through small opening distal anterior lower leg, placed ABD on today. Left open wound appears to be the same as last visit, some yellow slough present, small amounts of red tissue.    Compression: Tubes on B LE with wound dressing on L lower leg..     Treatment Today   12/9/2016  TREATMENT MINUTES COMMENTS   Evaluation     Self-care/ Home management 60 Washed B LE with warm water, towels.  Wound care with Santyl cream and oil emulsion dressing due to pt not having silver with today,  Medical compression bandages B LE: stockinette, 3 rolls of comprilan with tube on top. Did a 4th layer of comprillan on left LE lymphedema wraps. Patient educated to take the outer layer off if too uncomfortable.  No grey foam today on dorsum of feet to don shoes.    Measured bilat LE. Increased volumes noted today d/t not having compression bandages on for the past 4 days d/t the wraps falling off after last visit within 2 hours of leaving clinic. Patient attempted self wrapping and states he was unsuccessful.    Manual therapy No time today for MLD MLD/LE pump points B   Neuromuscular Re-education     Therapeutic Activity     Therapeutic Exercises     Gait training     Modality__________________                   Total 60    Blank areas are intentional and mean the treatment did not include these items.       Kimmie Vega,CLT  1/11/2017

## 2021-06-08 NOTE — PROGRESS NOTES
[facility] Daily Progress     Patient Name: Isaias Sales  Date: 1/19/2017  Visit #: 23  Referral Diagnosis: lymphedema, fibrosis, wound  Referring provider: Sierra Raza, *  Visit Diagnosis:     ICD-10-CM    1. 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 Swelling of limb M79.89    3. Acquired lymphedema of leg I89.0    4. Leg pain, bilateral M79.604     M79.605    5. Leg ulcer, left, with fat layer exposed L97.922    6. Venous hypertension of both lower extremities I87.303    7. History of DVT of lower extremity Z86.718          Assessment:     Patient was to see [facility] yesterday for fitting of velcro garment, but was unable to make the appointment. Has rescheduled for next week. Would be appropriate to see patient until he gets garment, otherwise, he will refill in LEs and revert back to where he was. Will keep patient on caseload until then.     Patient forgot new wound care supplies today, so went back to Santyl application with oil emulsion patch over top with ABD. Will start new wound care application next appointment as patient will make a point to bring it with him.     Goal Status:  Pt. will be independent with home exercise program: PROGRESSING TOWARD  Patient will have a decreased volume in : bilateral;LE;for better fit of clothing;to decrease risk of infection: PROGRESSING TOWARD  Patient will perform, verbalize self-management of: skin care;self-monitoring;compression wear;compression care: PROGRESSING TOWARD    Plan / Patient Education:     COntinue with POC until velcro garments arrive.    Subjective:     Saw Dr. Raza. Reports the wound is healing, has new wound care, but forgot it in clinic today.     Objective:     Patient to dept with elasticized stockinette only and noting bunching/rolling and binding below knees and at ankles resulting in pooling of edema of dorsal feet and lower leg.      Continues to have 3+ pretibial pitting of dorsal feet and L lower leg and 2+ of R  "lower leg.  Wound 1.3cm x 1.3cm; previously \"nickel size\" with notable decrease.      Treatment Today   1/13/2017  TREATMENT MINUTES COMMENTS   Evaluation     Self-care/ Home management 35 Continued to Educate pt in proper use of elasticized stockinette if used to minimize constriction and pooling below and above as well as need for continuous compression bandaging to minimize edema refill to promote wound healing and optimize home management.  Performed wound care removing prior bandaging and educated in proper wound care first cleansing and gently sloughing necrotic tissue followed by Santly, non adherent dressing, ABD pad and yo to secure (patient forgot new wound care supplies today).  Continued to instruct in gradient compression bandaging using tricofix stockinette followed by 8cm spiraled 3x around foot then up to ankle then center of heel, then spiraled lower 1/2 of heel bandage and back to ankle.  10cm x 5m figure 8 malleoli to widest calf, educated in likely benefit of adding 10cm x 10m figure 8 malleoli to below knee and ending with 12cm x 5m spiraled malleoli to knee and educated patient to remove and reapply over weekend if noting loosening of bandage.     Manual therapy 15 Bandaged then performed MLD trunk and B thighs/knees after lower legs bandaged.     Neuromuscular Re-education     Therapeutic Activity     Therapeutic Exercises     Gait training     Modality__________________                Total 50    Blank areas are intentional and mean the treatment did not include these items.       Kimmie Azul  1/19/2017  "

## 2021-06-08 NOTE — PROGRESS NOTES
Follow-up, bilateral lymphedema with left leg ulcer, patient is currently applying silver alginate to leg wound, continues lymphedema therapy.

## 2021-06-08 NOTE — PROGRESS NOTES
[facility] Daily Progress     Patient Name: Isaias Sales  Date: 1/16/2017  Visit #: 22  Referral Diagnosis: lymphedema, fibrosis, wound  Referring provider: Sierra Raza, *  Visit Diagnosis:     ICD-10-CM    1. 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 Swelling of limb M79.89    3. Acquired lymphedema of leg I89.0    4. Leg pain, bilateral M79.604     M79.605    5. Leg ulcer, left, with fat layer exposed L97.922    6. Venous hypertension of both lower extremities I87.303    7. History of DVT of lower extremity Z86.718          Assessment:     Overall, patient has had improvement in the amount of weeping drainage from LEs, this, he is pleased with. His left anterior lower leg wound appears to be non healing, but will be assessed by referring provider on 1-18-17.     Patient's progress in PT has been limited with his inability to self wrap when the bandages come off. He also has not been proactive about staying on top of scheduling PT visits as he had missed many opportunities for visits early on in his treatment d/t not scheduling when he was advised to. Also, his wound care has been compromised in the fact that he has not obtained the new wound care supplies since last visit with vascular center.     At this time, it may be appropriate for patient to get velcro compression garments and discharge PT though his volumes havent decreased as much as anticipated. Patient will discuss with referring provider on 1-18-17 his plan.     Goal Status:  Pt. will be independent with home exercise program: PROGRESSING TOWARD  Patient will have a decreased volume in : bilateral;LE;for better fit of clothing;to decrease risk of infection: PROGRESSING TOWARD  Patient will perform, verbalize self-management of: skin care;self-monitoring;compression wear;compression care: PROGRESSING TOWARD    Plan / Patient Education:     Continue with initial plan of care.  Encouraged patient to perform continuous gradient  "compression bandaging limiting time out of bandaging to 1 hour then reapply.  Consider trialing 1 velcro bandage alternative device that could be alternated between legs initially while other leg in bandaging, as patient voicing hesitation of purchasing expensive equipment without knowing if effective. He is scheduled for [facility] on 1-18-17 after visit with referring provider. Also encouraged patient try shopping at [employer] for shoes as he continues to have only 1 pair of tennis shoes he can don.    Subjective:   Frustrated with wound still not  Yet healed; feels R leg reducing.  Took bandages off 2 hours prior to PT session, placed elasticized stockinette on top.  Continues to c/o knee pain significantly limiting mobility.     Objective:     Patient to dept with elasticized stockinette only and noting bunching/rolling and binding below knees and at ankles resulting in pooling of edema of dorsal feet and lower leg.      Continues to have 3+ pretibial pitting of dorsal feet and L lower leg and 2+ of R lower leg.  Wound 1.3cm x 1.3cm; previously \"nickel size\" with notable decrease.      Patient demonstrates very quick refill and significant refill without compression and would likely not be a good candidate for compression stockings. Likely would benefit from using velcro bandage alternative devices which could be used during day paired with good fitting shoes to control dorsal foot swelling and could also be used at night as patient voicing bandaging too challenging.    Measured volumes on 1-11-17, see that note for volumes.    Treatment Today   1/13/2017  TREATMENT MINUTES COMMENTS   Evaluation     Self-care/ Home management 35 Educated pt in proper use of elasticized stockinette if used to minimize constriction and pooling below and above.  Educated in need for continuous compression bandaging to minimize edema refill to promote wound healing and optimize home management.  Performed wound care removing " prior bandaging and educated in proper wound care first cleansing and gently sloughing necrotic tissue followed by Santly, non adherent dressing, ABD pad and yo to secure.  Continued to instruct in gradient compression bandaging using tricofix stockinette followed by 8cm spiraled 3x around foot then up to ankle then center of heel, then spiraled lower 1/2 of heel bandage and back to ankle.  10cm x 5m figure 8 malleoli to widest calf, educated in likely benefit of adding 10cm x 10m figure 8 malleoli to below knee and ending with 12cm x 5m spiraled malleoli to knee and educated patient to remove and reapply over weekend if noting loosening of bandage.     Manual therapy 25 Bandaged then performed MLD trunk and B thighs/knees after lower legs bandaged.  Stimulated all regional LNs including B supraclavicular, B axillary, B inguinal and clearing thighs proximally/centrally.  Instructed in modified self MLD including LN stimulation of all regional LNs and long sweeping strokes of thighs/knees, especially medial knee bundles.   Neuromuscular Re-education     Therapeutic Activity     Therapeutic Exercises     Gait training     Modality__________________                Total 60    Blank areas are intentional and mean the treatment did not include these items.       Kimmie Azul  1/16/2017

## 2021-06-08 NOTE — PROGRESS NOTES
[facility] Daily Progress     Patient Name: Isaias Sales  Date: 1/13/2017  Visit #: 21  Referral Diagnosis: lymphedema, fibrosis, wound  Referring provider: Sierra Raza, *  Visit Diagnosis:     ICD-10-CM    1. 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 Swelling of limb M79.89    3. Acquired lymphedema of leg I89.0    4. Leg pain, bilateral M79.604     M79.605    5. Leg ulcer, left, with fat layer exposed L97.922          Assessment:     Patient is benefitting from skilled physical therapy and is making steady progress toward functional goals.  Patient is appropriate to continue with skilled physical therapy intervention, as indicated by initial plan of care.    Goal Status:  Pt. will be independent with home exercise program in : 6 weeks  Patient will have a decreased volume in : bilateral;LE;for better fit of clothing;to decrease risk of infection;in 6 weeks  Patient will perform, verbalize self-management of: skin care;self-monitoring;compression wear;compression care;in 6 weeks    Plan / Patient Education:     Continue with initial plan of care.  Encouraged patient to perform continuous gradient compression bandaging limiting time out of bandaging to 1 hour then reapply.  Consider trialing 1 velcro bandage alternative device that could be alternated between legs initially while other leg in bandaging, as patient voicing hesitation of purchasing expensive equipment without knowing if effective. Also encouraged patient try shopping at [employer] for shoes.    Subjective:   Frustrated with wound still not  Yet healed; feels R leg reducing.  Took bandages off this am to shower and placed elasticized stockinette on top.  Continues to c/o knee pain significantly limiting mobility.       Objective:     Patient to dept with elasticized stockinette only and noting bunching/rolling and binding below knees and at ankles resulting in pooling of edema of dorsal feet and lower leg.  3+ pretibial  "pitting of dorsal feet and L lower leg and 2+ of R lower leg.  Wound 1.3cm x 1.3cm; previously \"nickel size\" with notable decrease.  Patient demonstrates very quick refill and significant refill without compression and would not be a good candidate for compression stockings but may demonstrate better fit without constriction using velcro bandage alternative devices which could be used during day paired with good fitting shoes to control dorsal foot swelling and could also be used at night as patient voicing bandaging too challenging.    Treatment Today   1/13/2017  TREATMENT MINUTES COMMENTS   Evaluation     Self-care/ Home management 40 Educated pt in proper use of elasticized stockinette if used to minimize constriction and pooling below and above.  Educated in need for continuous compression bandaging to minimize edema refill to promote wound healing and optimize home management.  Performed wound care removing prior bandaging and educated in proper wound care first cleansing and gently sloughing necrotic tissue followed by Santly, non adherent dressing, ABD pad and yo to secure.  Continued to instruct in gradient compression bandaging using tricofix stockinette followed by 8cm spiraled 3x around foot then up to ankle then center of heel, then spiraled lower 1/2 of heel bandage and back to ankle.  10cm x 5m figure 8 malleoli to widest calf, educated in likely benefit of adding 10cm x 10m figure 8 malleoli to below knee and ending with 12cm x 5m spiraled malleoli to knee and educated patient to remove and reapply over weekend if noting loosening of bandage.  Patient scheduled with Nicholas 1/18/2017 and educated in compression options with likely benefit of trialing 1 velcro bandage alternative device for foot and lower leg which could be alternated between legs and worn during day as well as at night with other leg to remain in bandaging until patient assured device works effectively and confident in purchasing " one for each leg. Demonstrated sample [employer] Ready wrap lower leg and foot and patient voiced approval of product for home use.   Educated that he would not be a good candidate for compression stockings due to continued presence of pitting edema as well as edema extending to thighs but could consider a less compressive stocking which would not bind for social occassions.   Manual therapy 30 Performed edema clearing techniques of dorsal feet/malleoli prior to bandaging then performed MLD trunk and B thighs/knees after lower legs bandaged.  Stimulated all regional LNs including B supraclavicular, B axillary, B inguinal and clearing thighs proximally/centrally.  Instructed in modified self MLD including LN stimulation of all regional LNs and long sweeping strokes of thighs/knees, especially medial knee bundles.   Neuromuscular Re-education     Therapeutic Activity     Therapeutic Exercises     Gait training     Modality__________________                Total 70    Blank areas are intentional and mean the treatment did not include these items.       Lynn Granda  1/13/2017

## 2021-06-09 NOTE — PROGRESS NOTES
Physical THerapy Discharge Summary    Patient was last seen 1/30/2017 for a total of 26 visits.  Patient failed to attend further sessions.  Status as of 1/30/3017.  Patient does continue with Dr. Raza to assist in wound/lymphedema care.    Discharge to home program.    Lynn DIETZ, RUBÉN-ZOE

## 2021-06-10 NOTE — PROGRESS NOTES
[facility] Clinic Note  Patient Name: Isaias Sales  Patient Age: 71 y.o.  YOB: 1945  MRN: [id number]  ?  Date of Visit: 5/1/2017  Reason for Office Visit:   Chief Complaint   Patient presents with     Follow-up     would like to discuss pain med's , refused wt.       HPI: Isaias Sales 71 y.o. male with chronic pain 2/2 to OA, significant lymphedema, factor V leiden, who presents to clinic for medication refill.     He was a previous patient of Dr Buenrostro and was on a controlled substance agreement due to chronic pain from OA and lymphedema, taking 240 pills of percocet per month, 2 pills four times a day, he has never violated his substance agreement and needs 4 days of pain medication refilled today.     He follows at vascular for his lymphedema and has chronic weeping wound on left leg for many months. The swelling began after a DVT in the left leg 2 years ago with a diagnosis of Leiden V and started Coumadin. He had lymphedema therapy which helped greatly. He now is wearing his compression daily. He does the wound care with vascular. He has the velcro compression now    Review of Systems: As noted in HPI     Current Scheduled Meds:  Outpatient Encounter Prescriptions as of 5/1/2017   Medication Sig Dispense Refill     amLODIPine (NORVASC) 10 MG tablet Take 1 tablet (10 mg total) by mouth daily. 90 tablet 3     atenolol (TENORMIN) 50 MG tablet Take 1.5 tablets (75 mg total) by mouth daily. 90 tablet 1     atenolol (TENORMIN) 50 MG tablet TAKE ONE AND ONE-HALF TABLETS BY MOUTH EVERY  tablet 0     furosemide (LASIX) 20 MG tablet TAKE 1 TABLET BY MOUTH EVERY DAY 90 tablet 0     gabapentin (NEURONTIN) 300 MG capsule TAKE 1 CAPSULE BY MOUTH IN THE MORNING AND 2 CAPSULES AT BEDTIME 270 capsule 0     gemfibrozil (LOPID) 600 MG tablet Take 1 tablet (600 mg total) by mouth 2 (two) times a day before meals. 60 tablet 5     warfarin (COUMADIN) 7.5 MG tablet TAKE 1 TABLET BY MOUTH ON  MONDAY, WEDNESDAY,& FRIDAY AND ONE-HALF TABLET ALL OTHER DAYS 20 tablet 0     [DISCONTINUED] oxyCODONE-acetaminophen (PERCOCET) 5-325 mg per tablet Take 2 tablets by mouth 4 (four) times a day for 4 days. 32 tablet 0     [DISCONTINUED] oxyCODONE-acetaminophen (PERCOCET) 5-325 mg per tablet Take 2 tablets by mouth 4 (four) times a day for 4 days. 32 tablet 0     oxyCODONE-acetaminophen (ROXICET) 5-325 mg per tablet Take 2 tablets by mouth 4 (four) times a day for 4 days. 32 tablet 0     [DISCONTINUED] collagenase ointment Apply Santyl to wound daily; apply thick layer (paola thickness) 30 g 3     [DISCONTINUED] oxyCODONE-acetaminophen (PERCOCET) 5-325 mg per tablet Take 2 tablets by mouth 4 (four) times a day. 220 tablet 0     [DISCONTINUED] oxyCODONE-acetaminophen (ROXICET) 5-325 mg per tablet Take 2 tablets by mouth 4 (four) times a day for 32 doses. 32 tablet 0     Facility-Administered Encounter Medications as of 5/1/2017   Medication Dose Route Frequency Provider Last Rate Last Dose     lidocaine 2 % jelly (XYLOCAINE)   Topical PRN Ting Ryan NP   1 application at 02/15/17 0817       Objective / Physical Examination:  /78 (Patient Site: Right Arm, Patient Position: Sitting, Cuff Size: Adult Large)  Pulse 72  Wt Readings from Last 3 Encounters:   01/18/17 (!) 245 lb (111.1 kg)   10/31/16 (!) 245 lb (111.1 kg)   10/05/15 (!) 245 lb (111.1 kg)     There is no height or weight on file to calculate BMI. (>25?)    General: 71 y.o. male in no apparent distress. Cooperative. Affect normal  Integumentary. fibrosis of the feet and toes. There is +1 pitting edema bilaterally. Nails are thickened, thin surrounding skin, discolored and brittle. No active weeping of left leg.   Neuro: Alert and oriented, follows commands appropriately.    Assessment / Plan / Medical Decision Making:      Encounter Diagnoses   Name Primary?     Chronic kidney disease (CKD) stage G4/A1, severely decreased glomerular filtration  rate (GFR) between 15-29 mL/min/1.73 square meter and albuminuria creatinine ratio less than 30 mg/g Yes     Pain      Acquired lymphedema of leg      Leg ulcer, left, with fat layer exposed         1. Pain  Chronic pain in the setting of significant lymphedema and OA. Will refill percocet for 4 days. Has prescription written by another provider to be picked up in on 5/6 and establish care with new provider soon.   - oxyCODONE-acetaminophen (ROXICET) 5-325 mg per tablet; Take 2 tablets by mouth 4 (four) times a day for 4 days.  Dispense: 32 tablet; Refill: 0    2. Chronic kidney disease (CKD) stage G4/A1, severely decreased glomerular filtration rate (GFR) between 15-29 mL/min/1.73 square meter and albuminuria creatinine ratio less than 30 mg/g  Checked renal function today. Stable. Continue to avoid nephrotoxic agents, important to have good BP control. Hgb stable  - HM2(CBC w/o Differential)  - Renal Function Profile    3. Acquired lymphedema of leg  Continue with compression and wound care with vascular. Elevation.     4. Leg ulcer, left, with fat layer exposed  Wound recently debrided. Nutrition important for healing. Continue to follow at vascular     Follow up scheduled to establish care    Total time spent with patient was 15 minutes with >50% of time spent in face-to-face counseling regarding the above plan     Shiv Kunz MD  [facility] ([place])

## 2021-06-10 NOTE — PROGRESS NOTES
ASSESSMENT and PLAN:  1. Pain  We discussed his OA and pain management.  He's hopeful that he'll have better pain control when he can have orthopedic surgery.  First, he needs to get his edema under control.  He seems stuck and frustrated but not yet at a point where he's able to make lifestyle changes to help his management.  CSA updated.  - oxyCODONE-acetaminophen (ROXICET) 5-325 mg per tablet; Take 2 tablets by mouth 4 (four) times a day.  Dispense: 240 tablet; Refill: 0    2. Screening for colon cancer  He's not ready for a colonoscopy.  We discussed other options and he'll do stool cards annually.  - Occult Blood(ICT); Future    3. Acquired lymphedema of leg  He's following at the vascular clinic.    4. Essential hypertension  Well controlled.        Patient Instructions   Please complete and return the stool cards for colon cancer screening.  Please see me again in 3-6 months for follow up of your legs and medication.  It was nice meeting you today!  Take care!      Orders Placed This Encounter   Procedures     Occult Blood(ICT)     Standing Status:   Future     Standing Expiration Date:   8/7/2017     Medications Discontinued During This Encounter   Medication Reason     gemfibrozil (LOPID) 600 MG tablet Therapy completed     oxyCODONE-acetaminophen (ROXICET) 5-325 mg per tablet Reorder       No Follow-up on file.    ASSESSED PROBLEMS:  Problem List Items Addressed This Visit     Hypertension    Acquired lymphedema of leg      Other Visit Diagnoses     Pain    -  Primary    Relevant Medications    oxyCODONE-acetaminophen (ROXICET) 5-325 mg per tablet    Screening for colon cancer        Relevant Orders    Occult Blood(ICT)          CHIEF COMPLAINT:  Chief Complaint   Patient presents with     Establish Care     post thrombosis syndrome-blood clots in both legs       HISTORY OF PRESENT ILLNESS:  Isaias Sales is a 71 y.o. male is presenting to the clinic today to establish care.     Left Leg Ulcer and  Cellulitis: He just finishes a coarse of antibiotics prescribed by Dr. Raza. His left leg hurts all the time. He hurt the area with the edge of his  in October and it is still healing. His right leg does not have the weeping it is just swollen. He sits at a computer most of the day and therefore struggles to find time to elevate his legs. The pain medications allow him to carry out his activities of daily living.     DJD: He is unable to walk or get up easily in the morning without the pain medications. The oxycodone-acetaminophen no longer works as well as it initially did.     Chronic Kidney Disease: He has a history of kidney issues and his creatinine levels have been stable for a little over a year.     DVT of Lower Extremity: He is on Warfarin. He denies blood loss.     Health Maintenance: He is overdue for a colonoscopy and he would prefer to complete the Stool Cards for screening.     REVIEW OF SYSTEMS:   He denies fevers and chills. He denies stomach issues. He is no longer able to perform activities like biking and being more active. He denies feeling depressed. He has not considered therapy. All other systems are negative.    PFSH:  He is a . He is considering moving to [facility]. He denies history of colon cancer. He has a dog.     Past Medical History:   Diagnosis Date     DVT (deep venous thrombosis)      Factor V Leiden      Gout      Hypertension      Left leg DVT      Osteoarthritis      RLS (restless legs syndrome)      SVT (supraventricular tachycardia)      Past Surgical History:   Procedure Laterality Date     APPENDECTOMY       KNEE ARTHROSCOPY       Left hip replacement       WI APPENDECTOMY      Description: Appendectomy;  Recorded: 04/29/2014;     Family History   Problem Relation Age of Onset     Edema Mother      Stroke Father      No Medical Problems Sister      Factor V Leiden deficiency Brother      No Medical Problems Maternal Grandmother      No Medical  Problems Maternal Grandfather      No Medical Problems Paternal Grandmother      No Medical Problems Paternal Grandfather      Social History     Social History     Marital status:      Spouse name: N/A     Number of children: N/A     Years of education: N/A     Occupational History     Not on file.     Social History Main Topics     Smoking status: Current Some Day Smoker     Packs/day: 0.25     Years: 25.00     Smokeless tobacco: Never Used      Comment: 2-3 daily     Alcohol use No     Drug use: No     Sexual activity: No     Other Topics Concern     Not on file     Social History Narrative    .  No kids.  -semi retired.  House.       VITALS:  Vitals:    05/09/17 1316   BP: 128/82   Pulse: 62     Wt Readings from Last 3 Encounters:   01/18/17 (!) 245 lb (111.1 kg)   10/31/16 (!) 245 lb (111.1 kg)   10/05/15 (!) 245 lb (111.1 kg)       PHYSICAL EXAM:  Constitutional:  Reveals an alert, pleasant middle aged male.   Vitals:  Noted.   Extremities: significant edema bilaterally, minimal weeping of sore on left leg distally  Skin: chronic erythema of lower extremities   Neurologic: Normal     ADDITIONAL HISTORY SUMMARIZED (2): Reviewed the note from 5/1/17 regarding chronic pain and medications. Reviewed vascular surgery note from  4/17/17 regarding left anterior shin ulcer and cellulitis.   DECISION TO OBTAIN EXTRA INFORMATION (1): None.   RADIOLOGY TESTS (1): None.  LABS (1): Ordered labs.   MEDICINE TESTS (1): None.  INDEPENDENT REVIEW (2 each): None.     The visit lasted a total of 17 minutes face to face with the patient. Over 50% of the time was spent counseling and educating the patient about chronic conditions.    I, Deidra Salas, am scribing for and in the presence of, Dr. Janessa Barraza.    I, Dr. Janessa Barraza, personally performed the services described in this documentation, as scribed by Deidra Salas in my presence, and it is both accurate and  complete.    MEDICATIONS:  Current Outpatient Prescriptions   Medication Sig Dispense Refill     amLODIPine (NORVASC) 10 MG tablet Take 1 tablet (10 mg total) by mouth daily. 90 tablet 3     atenolol (TENORMIN) 50 MG tablet Take 1.5 tablets (75 mg total) by mouth daily. 90 tablet 1     atenolol (TENORMIN) 50 MG tablet TAKE ONE AND ONE-HALF TABLETS BY MOUTH EVERY  tablet 0     furosemide (LASIX) 20 MG tablet TAKE 1 TABLET BY MOUTH EVERY DAY 90 tablet 0     gabapentin (NEURONTIN) 300 MG capsule TAKE 1 CAPSULE BY MOUTH IN THE MORNING AND 2 CAPSULES AT BEDTIME 270 capsule 0     warfarin (COUMADIN) 7.5 MG tablet TAKE 1 TABLET BY MOUTH ON MONDAY, WEDNESDAY,& FRIDAY AND ONE-HALF TABLET ALL OTHER DAYS 20 tablet 0     oxyCODONE-acetaminophen (ROXICET) 5-325 mg per tablet Take 2 tablets by mouth 4 (four) times a day. 240 tablet 0     Current Facility-Administered Medications   Medication Dose Route Frequency Provider Last Rate Last Dose     lidocaine 2 % jelly (XYLOCAINE)   Topical PRN Ting Ryan NP   1 application at 02/15/17 7179       Total data points: 3

## 2021-06-15 PROBLEM — L60.3 NAIL DYSTROPHY: Status: ACTIVE | Noted: 2017-01-18

## 2021-06-25 NOTE — PROGRESS NOTES
Progress Notes by Sierra Raza MD at 2017  2:40 PM     Author: Sierra Raza MD Service: -- Author Type: Physician    Filed: 2017  4:46 PM Encounter Date: 2017 Status: Signed    : Sierra Raza MD (Physician)       Date of Service:  17    Date last seen by Dr. Raza:  02/15/2017    PCP: Yasmeen Buenrostro MD     Impression:  1. Left anterior shin ulcer-improved, but now with increased weeping and possible cellulitis left leg and continuing left lateral calf ulcer  2. Bilateral leg swelling and pain  3. Post thrombotic syndrome  4. Dependent swelling  5. Venous hypertension of the legs bialterally  6. Acquired lymphedema  7. Gout-left third toe pain improved  8. CKD-stage 4  9. Weight gain  10. Tobacco abuse  11.  Itching of legs     Plan:  1. Questions were answered.  2. Still should do nutritional labs, but I have ordered them twice and he has not done them.    3. After permission was obtained 2% Lidocaine HCL jelly was applied, under clean conditions, the left, calf, left foot post thrombotic and venous stasis/insufficiency with venous hypertension ulceration(s) were debrided using currette. Devitalized and non viable tissue, along with any fibrin and slough, was removed to improve granulation tissue formation, stimulate wound healing, decrease overall bacteria load, disrupt biofilm formation and decrease edge senescence. Total excisional debridement was 0.5 sq cm into the epidermis/dermis. Ulcers were improved afterwards and . Measures were as noted on the flow sheet .  4.  Because of underlying concern of cellulitis and wound infection, Cohen swab culture technique was used to send off anaerobic and aerobic Gram stain and culture from the left anterior shin ulcer.  5.  Continue velcro compression.  6.  He continues to say he does not want to stop smoking.  He smokes 2 cigarettes a day.    7.  Tegaderm Ag mesh then foam with silicon border. Also on  left lateral calf.  8.  Patient will follow up in 2 weeks with me or CNP.   9.  Will empirically start Keflex.      Time spent with patient 15 minutes with greater than 50% time in consultation, education and coordination of care.   ______________________________________________________________________    Chief Complaint: Bilateral leg swelling and weeping with left anterior leg ulcer     History of Present Illness:     Isaias Sales returns to the [facility] with bilateral leg swelling and weeping with left anterior leg ulcer . The swelling began after a DVT in the left leg 2 years ago with a diagnosis of Leiden V and started Coumadin.  He had lymphedema therapy which helped greatly.  He now is wearing his compression daily.  He does the wound care.  He has the velcro compression now which helps.   He did not see bariatric medicine as he will be moving to [facility].  He did not get the nutritional labs requested and written for twice.  There has been no new numbness, tingling or weakness. There have been no new masses, rashes, or swellings of any other joints. There has been no new decrease in appetite, unexplained weight loss, abdominal bloating and bowel or bladder changes.   The left leg is starting to eat more and get more achy.        Past Medical History:   Diagnosis Date   ? DVT (deep venous thrombosis)    ? Factor V Leiden    ? Gout    ? Hypertension    ? Left leg DVT    ? Osteoarthritis    ? RLS (restless legs syndrome)    ? SVT (supraventricular tachycardia)          Current Outpatient Prescriptions:   ?  amLODIPine (NORVASC) 10 MG tablet, Take 1 tablet (10 mg total) by mouth daily., Disp: 90 tablet, Rfl: 3  ?  atenolol (TENORMIN) 50 MG tablet, Take 1.5 tablets (75 mg total) by mouth daily., Disp: 90 tablet, Rfl: 1  ?  atenolol (TENORMIN) 50 MG tablet, TAKE ONE AND ONE-HALF TABLETS BY MOUTH EVERY DAY, Disp: 135 tablet, Rfl: 0  ?  furosemide (LASIX) 20 MG tablet, TAKE 1 TABLET BY  MOUTH EVERY DAY, Disp: 90 tablet, Rfl: 0  ?  gabapentin (NEURONTIN) 300 MG capsule, TAKE 1 CAPSULE BY MOUTH IN THE MORNING AND 2 CAPSULES AT BEDTIME, Disp: 270 capsule, Rfl: 0  ?  gemfibrozil (LOPID) 600 MG tablet, Take 1 tablet (600 mg total) by mouth 2 (two) times a day before meals., Disp: 60 tablet, Rfl: 5  ?  oxyCODONE-acetaminophen (PERCOCET) 5-325 mg per tablet, Take 2 tablets by mouth 4 (four) times a day., Disp: 220 tablet, Rfl: 0  ?  warfarin (COUMADIN) 7.5 MG tablet, TAKE 1 TABLET BY MOUTH ON MONDAY, WEDNESDAY,& FRIDAY AND ONE-HALF TABLET ALL OTHER DAYS, Disp: 20 tablet, Rfl: 0  ?  cephalexin (KEFLEX) 500 MG capsule, Take 1 capsule (500 mg total) by mouth 4 (four) times a day for 10 days., Disp: 40 capsule, Rfl: 0  ?  collagenase ointment, Apply Santyl to wound daily; apply thick layer (paola thickness), Disp: 30 g, Rfl: 3    Current Facility-Administered Medications:   ?  lidocaine 2 % jelly (XYLOCAINE), , Topical, PRN, Ting Ryan NP, 1 application at 02/15/17 0817    No Known Allergies    Social History     Social History   ? Marital status:      Spouse name: N/A   ? Number of children: N/A   ? Years of education: N/A     Occupational History   ? Not on file.     Social History Main Topics   ? Smoking status: Current Some Day Smoker     Packs/day: 0.25     Years: 25.00   ? Smokeless tobacco: Never Used      Comment: 2-3 daily   ? Alcohol use No   ? Drug use: No   ? Sexual activity: No     Other Topics Concern   ? Not on file     Social History Narrative    .  No kids.  -semi retired.  House.       Family History   Problem Relation Age of Onset   ? Edema Mother    ? Stroke Father    ? No Medical Problems Sister    ? Factor V Leiden deficiency Brother    ? No Medical Problems Maternal Grandmother    ? No Medical Problems Maternal Grandfather    ? No Medical Problems Paternal Grandmother    ? No Medical Problems Paternal Grandfather        Review of Systems:  Review of  systems is otherwise negative, except as noted above.  Full 12 point review of systems was completed.    Imaging:  XR FOOT LEFT 3 OR MORE VWS  1/16/2017 3:28 PM     INDICATION: Foot pain. History of gout and nonpressure ulcers of the left lower leg. Lymphedema. Chronic venous hypertension of the lower extremities. Chronic kidney disease.  COMPARISON: 5/19/2016.     FINDINGS: Hallux valgus deformity again noted. Degenerative change, pronounced at the first MTP joint. No fracture or dislocation. No definite erosion. Significant soft tissue swelling or edema of the foot is redemonstrated.    Labs:  No results found for: SEDRATE      No results found for: CRP        Lab Results   Component Value Date    CREATININE 1.96 (H) 05/19/2016      No results found for: HGBA1C        Lab Results   Component Value Date    BUN 37 (H) 05/19/2016              Lab Results   Component Value Date    ALBUMIN 3.7 04/06/2016       Vitamin D, Total (25-Hydroxy)   Date Value Ref Range Status   05/04/2015 58.0 30.0 - 80.0 ng/mL Final     No results found.  Physical Exam:    Vitals:    04/17/17 1421   BP: 140/70   Pulse: 72   Resp: 18   Temp: 99.2  F (37.3  C)    There is no height or weight on file to calculate BMI.    General:  71 y.o. male in no apparent distress.  Alert and oriented x 3.  Cooperative.  Affect normal    Lungs:  Clear to auscultation throughout    Integumentary: Skin of the legs continues to be erythematous, and hyperpigmentated, with hyperkeratosis and keratoderma and with positive Stemmer's Sign . There is significant fibrosis of the feet and toes. There is +1 pitting edema bilaterally. Nails are thickened, thin surrounding skin, decreased hair growth on toes, discolored and brittleness. On the distal third left toe there is less erythema and pain to palpation without calor.  The weeping has resoled.  At the mid shin the ulceration is much improved and almost healed.  There are small weeping areas of erythema more  proximately which are painful to palpitation.              Left anterior shin 1/18/17 4/17/17  When debrided near healed        L lateral calf 4/17/17       Vasc Edema 11/14/2016 12/14/2016 1/18/2017 2/15/2017 4/17/2017   Right just above MTP 29.4 30 30.2 29.2 31.7   Right Ankle 34 30.4 30.5 30 33.8   Right Widest Calf 46 51.4 52 48.5 53.4   Right Thigh Up 10cm - 63 63.2 57.4 57.4   Left - just above MTP 29.5 31.6 31.5 31 32.4   Left Ankle 31.6 32.5 32.5 32.8 35.6   Left Widest Calf 52 56 57 51 59.5   Left Thigh Up 10cm - 64 63.5 54.4 54.4     Measures up.    Sierra Raza MD, ABWMS, FACCWS, [facility]  Medical Director Wound Care and Lymphedema  [facility]  [phone]

## 2021-06-25 NOTE — PROGRESS NOTES
"Progress Notes by Sierra Raza MD at 2/15/2017  8:40 AM     Author: Sierra Raza MD Service: -- Author Type: Physician    Filed: 2/15/2017  9:21 AM Encounter Date: 2/15/2017 Status: Signed    : Sierra Raza MD (Physician)       Date of Service:  02/15/17    Date last seen by Dr. Raza:  2017    PCP: Yasmeen Buenrostro MD     Impression:  1. Left anterior shin ulcer-improved greatly  2. Bilateral leg swelling and pain  3. Post thrombotic syndrome  4. Dependent swelling  5. Venous hypertension of the legs bialterally  6. Acquired lymphedema  7. Gout-left third toe pain improved  8. CKD-stage 4  9. Weight gain  10. Tobacco abuse  11.  Itching of legs     Plan:  1. Type of swelling was reviewed in detail with the patient. Questions were answered and education was completed.  2. Vit A/C and TSH levels. Any contribution to impaired healing?  Still needs to do.  I have written for this twice before and he has \"forgotten\".  I will write for it one more time.  3. After permission was obtained 2% Lidocaine HCL jelly was applied, under clean conditions, the left, calf, left foot post thrombotic and venous stasis/insufficiency with venous hypertension ulceration(s) were debrided using currette. Devitalized and non viable tissue, along with any fibrin and slough, was removed to improve granulation tissue formation, stimulate wound healing, decrease overall bacteria load, disrupt biofilm formation and decrease edge senescence. Total excisional debridement was 0.5 sq cm into the epidermis/dermis. Ulcers were improved afterwards and . Measures were as noted on the flow sheet .  4.  Getting measures for Velcro compression today.  If not available right away nursing to put on same dressings then short stretch or two layer and change weekly until Velcro arrives.   5.  He continues to say he does not want to stop smoking.  He smokes 2 cigarettes a day.    6.  Needs to see bariatrics.  " Sees them later this month.    7.  Tegaderm Ag mesh then foam with silicon border.  8.  Patient will follow up in 4 weeks with me.   9.  As he is not seeing his PCP until May, and has not had his renal function checked since 4/15, I will also check his renal function.  If any significant changes he will be sent back to his PCP sooner.     Time spent with patient 15 minutes with greater than 50% time in consultation, education and coordination of care.   ______________________________________________________________________    Chief Complaint: Bilateral leg swelling and weeping with left anterior leg ulcer     History of Present Illness:     Isaias Sales returns to the [facility] with bilateral leg swelling and weeping with left anterior leg ulcer . The swelling began after a DVT in the left leg 2 years ago with a diagnosis of Leiden V and started Coumadin.  He had lymphedema therapy which helped greatly.  He admits he hasn't been wearing his compression for the last 2 weeks.  He does the wound care.  He is scheduled to get measured for Velcro compression today.  He also will be seeing bariatric medicine latera this month.  He did not get the nutritional labs requested.  . There has been no new numbness, tingling or weakness. There have been no new masses, rashes, or swellings of any other joints. There has been no new decrease in appetite, unexplained weight loss, abdominal bloating and bowel or bladder changes.   There is no new pain.         Past Medical History:   Diagnosis Date   ? DVT (deep venous thrombosis)    ? Factor V Leiden    ? Gout    ? Hypertension    ? Left leg DVT    ? Osteoarthritis    ? RLS (restless legs syndrome)    ? SVT (supraventricular tachycardia)          Current Outpatient Prescriptions:   ?  amLODIPine (NORVASC) 10 MG tablet, Take 1 tablet (10 mg total) by mouth daily., Disp: 90 tablet, Rfl: 3  ?  atenolol (TENORMIN) 50 MG tablet, Take 1.5 tablets (75 mg total) by  mouth daily., Disp: 90 tablet, Rfl: 1  ?  atenolol (TENORMIN) 50 MG tablet, TAKE ONE AND ONE-HALF TABLETS BY MOUTH EVERY DAY, Disp: 135 tablet, Rfl: 0  ?  collagenase ointment, Apply Santyl to wound daily; apply thick layer (paola thickness), Disp: 30 g, Rfl: 3  ?  furosemide (LASIX) 20 MG tablet, TAKE 1 TABLET BY MOUTH EVERY DAY, Disp: 90 tablet, Rfl: 0  ?  gabapentin (NEURONTIN) 300 MG capsule, Take 2 capsules at bedtime and one in the am, Disp: 270 capsule, Rfl: 3  ?  oxyCODONE-acetaminophen (PERCOCET) 5-325 mg per tablet, Take 2 tablets by mouth 4 (four) times a day., Disp: 240 tablet, Rfl: 0  ?  warfarin (COUMADIN) 7.5 MG tablet, TAKE ONE TABLET BY MOUTH ON MONDAY, WEDNESDAY AND FRIDAY AND ONE-HALF TABLET ON ALL OTHER DAYS., Disp: 20 tablet, Rfl: 0  ?  gemfibrozil (LOPID) 600 MG tablet, Take 1 tablet (600 mg total) by mouth 2 (two) times a day before meals., Disp: 60 tablet, Rfl: 5    Current Facility-Administered Medications:   ?  lidocaine 2 % jelly (XYLOCAINE), , Topical, PRN, Ting Ryan NP, 1 application at 02/15/17 0817    No Known Allergies    Social History     Social History   ? Marital status:      Spouse name: N/A   ? Number of children: N/A   ? Years of education: N/A     Occupational History   ? Not on file.     Social History Main Topics   ? Smoking status: Current Some Day Smoker     Packs/day: 0.25     Years: 25.00   ? Smokeless tobacco: Never Used      Comment: 2-3 daily   ? Alcohol use No   ? Drug use: No   ? Sexual activity: No     Other Topics Concern   ? Not on file     Social History Narrative    .  No kids.  -semi retired.  House.       Family History   Problem Relation Age of Onset   ? Edema Mother    ? Stroke Father    ? No Medical Problems Sister    ? Factor V Leiden deficiency Brother    ? No Medical Problems Maternal Grandmother    ? No Medical Problems Maternal Grandfather    ? No Medical Problems Paternal Grandmother    ? No Medical Problems Paternal  Grandfather        Review of Systems:  Review of systems is otherwise negative, except as noted above.  Full 12 point review of systems was completed.    Imaging:  XR FOOT LEFT 3 OR MORE VWS  1/16/2017 3:28 PM     INDICATION: Foot pain. History of gout and nonpressure ulcers of the left lower leg. Lymphedema. Chronic venous hypertension of the lower extremities. Chronic kidney disease.  COMPARISON: 5/19/2016.     FINDINGS: Hallux valgus deformity again noted. Degenerative change, pronounced at the first MTP joint. No fracture or dislocation. No definite erosion. Significant soft tissue swelling or edema of the foot is redemonstrated.    Labs:  No results found for: SEDRATE      No results found for: CRP        Lab Results   Component Value Date    CREATININE 1.96 (H) 05/19/2016      No results found for: HGBA1C        Lab Results   Component Value Date    BUN 37 (H) 05/19/2016              Lab Results   Component Value Date    ALBUMIN 3.7 04/06/2016       VITAMIN D, TOTAL (25-HYDROXY)   Date Value Ref Range Status   05/04/2015 58.0 30.0 - 80.0 ng/mL Final     Xr Foot Left 3 Or More Vws    Result Date: 1/16/2017  XR FOOT LEFT 3 OR MORE VWS 1/16/2017 3:28 PM INDICATION: Foot pain. History of gout and nonpressure ulcers of the left lower leg. Lymphedema. Chronic venous hypertension of the lower extremities. Chronic kidney disease. COMPARISON: 5/19/2016. FINDINGS: Hallux valgus deformity again noted. Degenerative change, pronounced at the first MTP joint. No fracture or dislocation. No definite erosion. Significant soft tissue swelling or edema of the foot is redemonstrated.    Physical Exam:    Vitals:    02/15/17 0700   BP: 132/68   Pulse: 64   Resp: 16   Temp: 98.1  F (36.7  C)    There is no height or weight on file to calculate BMI.    General:  71 y.o. male in no apparent distress.  Alert and oriented x 3.  Cooperative.  Affect normal    Integumentary: Skin of the legs continues to be erythematous, and  hyperpigmentated, with hyperkeratosis and keratoderma and with positive Stemmer's Sign . There is significant fibrosis of the feet and toes. There is +1 pitting edema bilaterally. Nails are thickened, thin surrounding skin, decreased hair growth on toes, discolored and brittleness. On the distal third left toe there is less erythema and pain to palpation without calor.  This is weeping.   At the mid shin the ulceration does appear to have better granulation tissue and is much smaller.  This has slough.  It now measures 1.0 x 0.5 cm.  It is 0.1 cm deep.  It is noted however the images are still elevated.  Please see flow sheet for measures.  See photos.        Left anterior shin 1/18/17        Left third toe 1/18/17       Vasc Edema 10/31/2016 11/14/2016 12/14/2016 1/18/2017 2/15/2017   Right just above MTP 32 29.4 30 30.2 29.2   Right Ankle 35.3 34 30.4 30.5 30   Right Widest Calf 49.5 46 51.4 52 48.5   Right Thigh Up 10cm 63 - 63 63.2 57.4   Left - just above MTP 34.5 29.5 31.6 31.5 31   Left Ankle 36.7 31.6 32.5 32.5 32.8   Left Widest Calf 50 52 56 57 51   Left Thigh Up 10cm 64 - 64 63.5 54.4     Measures improved.    Sierra Raza MD, ABWMS, FACCWS, [facility]  Medical Director Wound Care and Lymphedema  [facility]  [phone]

## 2021-06-25 NOTE — PROGRESS NOTES
Progress Notes by Sierra Raza MD at 2017  2:20 PM     Author: Sierra Raza MD Service: -- Author Type: Physician    Filed: 2017  4:28 PM Encounter Date: 2017 Status: Signed    : Sierra Raza MD (Physician)       Date of Service:  17    Date last seen by Dr. Raza:  2017    PCP: Janessa Barraza MD     Impression:  1. Left anterior shin ulcer-improved-closed  2. Bilateral leg swelling and pain  3. Post thrombotic syndrome  4. Dependent swelling  5. Venous hypertension of the legs bilaterally  6. Acquired lymphedema  7. Leiden V mutation  8. CKD-stage 4  9. Onychomycosis  10. Tobacco abuse       Plan:  1. Questions were answered.  2. There is significant thickening of the toenails with decreased hair growth, shininess to the skin of the toes with some rubor.  There is onychomycosis noted.  Patient has history of embolism and thrombosis.  Thus, after permission was obtained, toenails were all debrided.  3.  Continue velcro compression.  4.  He continues to say he does not want to stop smoking.  He smokes 2 cigarettes a day.    5.  Moving to [facility].  Follow up PRN.    Time spent with patient 15 minutes with greater than 50% time in consultation, education and coordination of care.   ______________________________________________________________________    Chief Complaint: Bilateral leg swelling and left anterior leg ulcer     History of Present Illness:     Isaias Sales returns to the [facility] with bilateral leg swelling and weeping with left anterior leg ulcer . The swelling began after a DVT in the left leg 2 years ago with a diagnosis of Leiden V and started Coumadin.  He had lymphedema therapy which helped greatly.  He now is wearing his velcro. His ulcerations have healed.  There is no weeping or pain.  His toenails are starting to dig into the toes now.  They are painful. There has been no new numbness, tingling or  weakness. There have been no new masses, rashes, or swellings of any other joints. There has been no new decrease in appetite, unexplained weight loss, abdominal bloating and bowel or bladder changes.         Past Medical History:   Diagnosis Date   ? DVT (deep venous thrombosis)    ? Factor V Leiden    ? Gout    ? Hypertension    ? Left leg DVT    ? Osteoarthritis    ? RLS (restless legs syndrome)    ? SVT (supraventricular tachycardia)          Current Outpatient Prescriptions:   ?  amLODIPine (NORVASC) 10 MG tablet, Take 1 tablet (10 mg total) by mouth daily., Disp: 90 tablet, Rfl: 3  ?  atenolol (TENORMIN) 50 MG tablet, Take 1.5 tablets (75 mg total) by mouth daily., Disp: 90 tablet, Rfl: 1  ?  furosemide (LASIX) 20 MG tablet, TAKE 1 TABLET BY MOUTH EVERY DAY, Disp: 90 tablet, Rfl: 0  ?  gabapentin (NEURONTIN) 300 MG capsule, TAKE 1 CAPSULE BY MOUTH DAILY IN THE MORNING, THEN 2 CAPSULES DAILY AT BEDTIME., Disp: 270 capsule, Rfl: 1  ?  oxyCODONE-acetaminophen (ROXICET) 5-325 mg per tablet, Take 2 tablets by mouth 4 (four) times a day., Disp: 240 tablet, Rfl: 0  ?  warfarin (COUMADIN) 7.5 MG tablet, TAKE 1 TABLET BY MOUTH ON MONDAY, WEDNESDAY,& FRIDAY AND ONE-HALF TABLET ALL OTHER DAYS.  Due for INR, Disp: 20 tablet, Rfl: 0    Current Facility-Administered Medications:   ?  lidocaine 2 % jelly (XYLOCAINE), , Topical, PRN, Ting Ryan NP, 1 application at 02/15/17 0817    No Known Allergies    Social History     Social History   ? Marital status:      Spouse name: N/A   ? Number of children: N/A   ? Years of education: N/A     Occupational History   ? Not on file.     Social History Main Topics   ? Smoking status: Current Some Day Smoker     Packs/day: 0.25     Years: 25.00   ? Smokeless tobacco: Never Used      Comment: 2-3 daily   ? Alcohol use No   ? Drug use: No   ? Sexual activity: No     Other Topics Concern   ? Not on file     Social History Narrative    .  No kids.  -semi  retired.  House.       Family History   Problem Relation Age of Onset   ? Edema Mother    ? Stroke Father    ? No Medical Problems Sister    ? Factor V Leiden deficiency Brother    ? No Medical Problems Maternal Grandmother    ? No Medical Problems Maternal Grandfather    ? No Medical Problems Paternal Grandmother    ? No Medical Problems Paternal Grandfather        Review of Systems:  Review of systems is otherwise negative, except as noted above.  Full 12 point review of systems was completed.    Imaging:  XR FOOT LEFT 3 OR MORE VWS  1/16/2017 3:28 PM     INDICATION: Foot pain. History of gout and nonpressure ulcers of the left lower leg. Lymphedema. Chronic venous hypertension of the lower extremities. Chronic kidney disease.  COMPARISON: 5/19/2016.     FINDINGS: Hallux valgus deformity again noted. Degenerative change, pronounced at the first MTP joint. No fracture or dislocation. No definite erosion. Significant soft tissue swelling or edema of the foot is redemonstrated.    Labs:        Lab Results   Component Value Date    CREATININE 1.92 (H) 05/01/2017      No results found for: HGBA1C        Lab Results   Component Value Date    BUN 30 (H) 05/01/2017              Lab Results   Component Value Date    ALBUMIN 3.8 05/01/2017       Vitamin D, Total (25-Hydroxy)   Date Value Ref Range Status   05/04/2015 58.0 30.0 - 80.0 ng/mL Final     No results found.     Physical Exam:    Vitals:    06/05/17 1407   BP: 104/44   Pulse: 60   Resp: 16   Temp: 98.3  F (36.8  C)     BMI 33.25    General:  71 y.o. male in no apparent distress.  Alert and oriented x 3.  Cooperative.  Affect normal    Lungs:  Clear to auscultation throughout    Integumentary: Skin of the legs continues to be erythematous, and hyperpigmentated, with hyperkeratosis and keratoderma and with positive Stemmer's Sign. There is significant fibrosis of the feet and toes. Ulcerations have closed and there is no weeping or discharge noted.   Nails are  thickened, thin surrounding skin, decreased hair growth on toes, discolored and brittleness.              Left anterior shin 1/18/17 4/17/17  When debrided near healed        L lateral calf 4/17/17       Vasc Edema 12/14/2016 1/18/2017 2/15/2017 4/17/2017 6/5/2017   Right just above MTP 30 30.2 29.2 31.7 31   Right Ankle 30.4 30.5 30 33.8 33.5   Right Widest Calf 51.4 52 48.5 53.4 48   Right Thigh Up 10cm 63 63.2 57.4 57.4 61.8   Left - just above MTP 31.6 31.5 31 32.4 32.5   Left Ankle 32.5 32.5 32.8 35.6 34.4   Left Widest Calf 56 57 51 59.5 54   Left Thigh Up 10cm 64 63.5 54.4 54.4 64.5     Measures have come back down.    Sierra Raza MD, ABWMS, FACCWS, [facility]  Medical Director Wound Care and Lymphedema  [facility]  [phone]

## 2021-06-25 NOTE — PROGRESS NOTES
Progress Notes by Sierra Raza MD at 2017  8:20 AM     Author: Sierra Raza MD Service: -- Author Type: Physician    Filed: 2017  9:59 AM Encounter Date: 2017 Status: Signed    : iSerra Raza MD (Physician)       Date of Service:  17    Date last seen by Dr. Raza:  2016    PCP: Yasmeen Buenrostro MD     Impression:  1. Left anterior shin ulcer-improved granulation tissue  2. Bilateral leg swelling and pain  3. Post thrombotic syndrome  4. Dependent swelling  5. Venous hypertension of the legs bialterally  6. Acquired lymphedema  7. Gout-left third toe pain  8. CKD-stage 4  9. Weight gain  10. Tobacco abuse     Plan:  1. Type of swelling was reviewed in detail with the patient. Questions were answered and education was completed.  2. Vit A/C and TSH levels. Any contribution to impaired healing?  Still needs to do.  Will get done with his anticoagulation labs.  3. After permission was obtained 2% Lidocaine HCL jelly was applied, under clean conditions, the left, calf, left foot post thrombotic and venous stasis/insufficiency with venous hypertension ulceration(s) were debrided using currette. Devitalized and non viable tissue, along with any fibrin and slough, was removed to improve granulation tissue formation, stimulate wound healing, decrease overall bacteria load, disrupt biofilm formation and decrease edge senescence. Total excisional debridement was 2.5 sq cm into the subcutaneous tissue. Ulcers were improved afterwards and . Measures were as noted on the flow sheet .  4.  Continue Velcro compression.  5. Contribution of tobacco use to multiple medical problems reviewed again.    6.  Needs to see bariatrics.  Rewritten.  7.  Tegaderm Ag mesh then foam with silicon border, then comprilan.  To get velcro compression soon.  8.  Patient will follow up in 4 weeks with me.     Time spent with patient 15 minutes with greater than 50% time in  consultation, education and coordination of care.   ______________________________________________________________________    Chief Complaint: Bilateral leg swelling and weeping with left anterior leg ulcer     History of Present Illness:     Isaias Sales returns to the [facility] with bilateral leg swelling and weeping with left anterior leg ulcer (3 weeks). The swelling began after a DVT in the left leg 2 years ago with a diagnosis of Leiden V and started Coumadin. He was also given compression socks which were very hard to get on and was not wearing them regularly.  He hit the left shin on the  and has had a sore which was not healing. He had been using Neosporin on it with a band aid.  The swelling was worsening.  I was asked to see him.  History is complicated by known DVT. There has been no new numbness, tingling or weakness. There have been no new masses, rashes, or swellings of any other joints. There has been no new decrease in appetite, unexplained weight loss, abdominal bloating and bowel or bladder changes.  He has been getting lymphedema therapy and the therapy continues to help.  There is no new pain.  The ulceration continues.  He did not have his labs done as requested, there was some confusion.  His xray was done.  There was no evidence of osteomyelitis of the left 3rd toe.  It continues to be painful.        Past Medical History   Diagnosis Date   ? DVT (deep venous thrombosis)    ? Factor V Leiden    ? Gout    ? Hypertension    ? Left leg DVT    ? Osteoarthritis    ? RLS (restless legs syndrome)    ? SVT (supraventricular tachycardia)          Current Outpatient Prescriptions:   ?  amLODIPine (NORVASC) 10 MG tablet, Take 1 tablet (10 mg total) by mouth daily., Disp: 90 tablet, Rfl: 3  ?  atenolol (TENORMIN) 50 MG tablet, Take 1.5 tablets (75 mg total) by mouth daily., Disp: 90 tablet, Rfl: 1  ?  atenolol (TENORMIN) 50 MG tablet, TAKE ONE AND ONE-HALF TABLETS BY MOUTH  EVERY DAY, Disp: 135 tablet, Rfl: 0  ?  collagenase ointment, Apply Santyl to wound daily; apply thick layer (paola thickness), Disp: 30 g, Rfl: 3  ?  furosemide (LASIX) 20 MG tablet, TAKE 1 TABLET BY MOUTH EVERY DAY, Disp: 90 tablet, Rfl: 0  ?  gabapentin (NEURONTIN) 300 MG capsule, Take 2 capsules at bedtime and one in the am, Disp: 270 capsule, Rfl: 3  ?  gemfibrozil (LOPID) 600 MG tablet, Take 1 tablet (600 mg total) by mouth 2 (two) times a day before meals., Disp: 60 tablet, Rfl: 5  ?  oxyCODONE-acetaminophen (PERCOCET) 5-325 mg per tablet, Take 2 tablets by mouth 4 (four) times a day., Disp: 240 tablet, Rfl: 0  ?  warfarin (COUMADIN) 7.5 MG tablet, TAKE ONE TABLET BY MOUTH ON MONDAY, WEDNESDAY AND FRIDAY AND ONE-HALF TABLET ON ALL OTHER DAYS., Disp: 20 tablet, Rfl: 0    Current Facility-Administered Medications:   ?  lidocaine 2 % jelly (XYLOCAINE), , Topical, PRN, Ting Ryan NP    No Known Allergies    Social History     Social History   ? Marital status:      Spouse name: N/A   ? Number of children: N/A   ? Years of education: N/A     Occupational History   ? Not on file.     Social History Main Topics   ? Smoking status: Current Some Day Smoker     Packs/day: 0.25     Years: 25.00   ? Smokeless tobacco: Never Used      Comment: 2-3 daily   ? Alcohol use No   ? Drug use: No   ? Sexual activity: No     Other Topics Concern   ? Not on file     Social History Narrative    .  No kids.  -semi retired.  House.       Family History   Problem Relation Age of Onset   ? Edema Mother    ? Stroke Father    ? No Medical Problems Sister    ? Factor V Leiden deficiency Brother    ? No Medical Problems Maternal Grandmother    ? No Medical Problems Maternal Grandfather    ? No Medical Problems Paternal Grandmother    ? No Medical Problems Paternal Grandfather        Review of Systems:  Review of systems is otherwise negative, except as noted above.  Full 12 point review of systems was  completed.    Imaging:  XR FOOT LEFT 3 OR MORE VWS  1/16/2017 3:28 PM     INDICATION: Foot pain. History of gout and nonpressure ulcers of the left lower leg. Lymphedema. Chronic venous hypertension of the lower extremities. Chronic kidney disease.  COMPARISON: 5/19/2016.     FINDINGS: Hallux valgus deformity again noted. Degenerative change, pronounced at the first MTP joint. No fracture or dislocation. No definite erosion. Significant soft tissue swelling or edema of the foot is redemonstrated.    Labs:  No results found for: SEDRATE      No results found for: CRP        Lab Results   Component Value Date    CREATININE 1.96 (H) 05/19/2016      No results found for: HGBA1C        Lab Results   Component Value Date    BUN 37 (H) 05/19/2016              Lab Results   Component Value Date    ALBUMIN 3.7 04/06/2016       VITAMIN D, TOTAL (25-HYDROXY)   Date Value Ref Range Status   05/04/2015 58.0 30.0 - 80.0 ng/mL Final     Xr Foot Left 3 Or More Vws    Result Date: 1/16/2017  XR FOOT LEFT 3 OR MORE VWS 1/16/2017 3:28 PM INDICATION: Foot pain. History of gout and nonpressure ulcers of the left lower leg. Lymphedema. Chronic venous hypertension of the lower extremities. Chronic kidney disease. COMPARISON: 5/19/2016. FINDINGS: Hallux valgus deformity again noted. Degenerative change, pronounced at the first MTP joint. No fracture or dislocation. No definite erosion. Significant soft tissue swelling or edema of the foot is redemonstrated.    Physical Exam:    Vitals:    01/18/17 0828   BP: 138/72   Pulse: 78   Resp: 16   Temp: 98  F (36.7  C)    Body mass index is 33.23 kg/(m^2).    General:  71 y.o. male in no apparent distress.  Alert and oriented x 3.  Cooperative.  Affect normal    Integumentary: Skin of the legs continues to be erythematous, and hyperpigmentated, with hyperkeratosis and keratoderma and with positive Stemmer's Sign . There is significant fibrosis of the feet and toes. There is +2 pitting edema  bilaterally. Nails are thickened, thin surrounding skin, decreased hair growth on toes, discolored and brittleness. On the distal third left toe there is erythema and pain to palpation with calor. There is a large distal toe callous.  This is weeping.  .  At the mid shin the ulceration does appear to have better granulation tissue.  This has slough.  It now measures 1.5 x 1.5 cm.  It is 0.2 cm deep.  It is noted however the images are still elevated.  The small area above that of ulceration has healed.  Please see flow sheet for measures.  See photos.        Left anterior shin 1/18/17        Left third toe 1/18/17       Vasc Edema 10/31/2016 11/14/2016 12/14/2016 1/18/2017   Right just above MTP 32 29.4 30 30.2   Right Ankle 35.3 34 30.4 30.5   Right Widest Calf 49.5 46 51.4 52   Right Thigh Up 10cm 63 - 63 63.2   Left - just above MTP 34.5 29.5 31.6 31.5   Left Ankle 36.7 31.6 32.5 32.5   Left Widest Calf 50 52 56 57   Left Thigh Up 10cm 64 - 64 63.5     Measures stable.    Sierra Raza MD, ABWMS, FACCWS, [facility]  Medical Director Wound Care and Lymphedema  [facility]  [phone]

## 2021-08-22 ENCOUNTER — HEALTH MAINTENANCE LETTER (OUTPATIENT)
Age: 76
End: 2021-08-22

## 2021-10-17 ENCOUNTER — HEALTH MAINTENANCE LETTER (OUTPATIENT)
Age: 76
End: 2021-10-17

## 2022-10-01 ENCOUNTER — HEALTH MAINTENANCE LETTER (OUTPATIENT)
Age: 77
End: 2022-10-01

## 2023-10-21 ENCOUNTER — HEALTH MAINTENANCE LETTER (OUTPATIENT)
Age: 78
End: 2023-10-21